# Patient Record
Sex: MALE | Race: WHITE | NOT HISPANIC OR LATINO | Employment: OTHER | URBAN - METROPOLITAN AREA
[De-identification: names, ages, dates, MRNs, and addresses within clinical notes are randomized per-mention and may not be internally consistent; named-entity substitution may affect disease eponyms.]

---

## 2023-01-13 ENCOUNTER — HOSPITAL ENCOUNTER (EMERGENCY)
Facility: HOSPITAL | Age: 73
Discharge: HOME | End: 2023-01-13
Attending: EMERGENCY MEDICINE

## 2023-01-13 VITALS
OXYGEN SATURATION: 95 % | HEART RATE: 92 BPM | SYSTOLIC BLOOD PRESSURE: 160 MMHG | RESPIRATION RATE: 18 BRPM | DIASTOLIC BLOOD PRESSURE: 85 MMHG

## 2023-01-13 DIAGNOSIS — S61.011A LACERATION OF RIGHT THUMB WITHOUT FOREIGN BODY WITHOUT DAMAGE TO NAIL, INITIAL ENCOUNTER: Primary | ICD-10-CM

## 2023-01-13 RX ORDER — LIDOCAINE HYDROCHLORIDE 10 MG/ML
5 INJECTION, SOLUTION EPIDURAL; INFILTRATION; INTRACAUDAL; PERINEURAL ONCE
Status: COMPLETED | OUTPATIENT
Start: 2023-01-13 | End: 2023-01-13

## 2023-01-13 RX ADMIN — LIDOCAINE HYDROCHLORIDE 5 ML: 10 INJECTION, SOLUTION EPIDURAL; INFILTRATION; INTRACAUDAL at 20:49

## 2023-01-14 NOTE — ED ATTENDING ATTESTATION
1/13/2023  IDilip DO, saw and evaluated the patient  I have discussed the patient with the resident/non-physician practitioner and agree with the resident's/non-physician practitioner's findings, Plan of Care, and MDM as documented in the resident's/non-physician practitioner's note, except where noted  All available labs and Radiology studies were reviewed  I was present for key portions of any procedure(s) performed by the resident/non-physician practitioner and I was immediately available to provide assistance  At this point I agree with the current assessment done in the Emergency Department  I have conducted an independent evaluation of this patient a history and physical is as follows:      77-year-old male presents with a laceration to his right thumb  Sustained it while squeezing a glass when it fell onto the sink, glass shattered and cut his hand no foreign body sensation  No other lacerations, this is over the lateral aspect of his thumb not over any tendons  ,  Full range of motion last tetanus shot was 3 years ago  History obtained from patient and his son      Review of Systems   Constitutional: Negative for fever  Respiratory: Negative for chest tightness and shortness of breath  Cardiovascular: Negative for chest pain  Skin: Negative for rash  Neurological: Negative for dizziness, light-headedness and headaches  Physical Exam  Vitals reviewed  Constitutional:       Appearance: He is well-developed  HENT:      Head: Atraumatic  Eyes:      General: No scleral icterus  Right eye: No discharge  Left eye: No discharge  Conjunctiva/sclera: Conjunctivae normal    Neck:      Trachea: No tracheal deviation  Pulmonary:      Effort: Pulmonary effort is normal  No respiratory distress  Breath sounds: No stridor  Musculoskeletal:         General: No deformity  Cervical back: Neck supple  Skin:     General: Skin is warm and dry  Coloration: Skin is not pale  Findings: No erythema or rash  Comments: 1 5 cm laceration lateral aspect of right thumb no active bleeding full range of motion full strength against resistance in flexion and extension no evidence of tendon laceration   Neurological:      Mental Status: He is alert  Motor: No abnormal muscle tone  Coordination: Coordination normal                         ED Course         Critical Care Time  Procedures      MDM  Number of Diagnoses or Management Options  Diagnosis management comments:       Initial ED assessment: 80-year-old male laceration to lateral aspect of the thumb without evidence of tendon injury    Initial ED plan:   Suture repair,  patient discharged           Amount and/or Complexity of Data Reviewed  Decide to obtain previous medical records or to obtain history from someone other than the patient: yes  Obtain history from someone other than the patient: yes  Review and summarize past medical records: yes            Time reflects when diagnosis was documented in both MDM as applicable and the Disposition within this note     Time User Action Codes Description Comment    1/13/2023  8:56 PM Albina, Jay [address] Laceration of right thumb without foreign body without damage to nail, initial encounter       ED Disposition     ED Disposition   Discharge    Condition   Stable    Date/Time   Fri Jan 13, 2023  8:56 PM    Comment   Ellie Cobos discharge to home/self care                 Follow-up Information     Follow up With Specialties Details Why Contact Info Additional [address] Emergency Department Emergency Medicine Go in 10 days For suture removal [address] [facility] [id number] [place] [address] Emergency Department, [address], [place], [place], [address]

## 2023-01-14 NOTE — ED PROVIDER NOTES
History  Chief Complaint   Patient presents with   • Finger Laceration     Pt arrives c/o laceration from broken glass to R hand 1st digit  Bleeding controlled  68-year-old male presents with a laceration to his right thumb  Sustained it while squeezing a glass when it fell onto the sink, glass shattered and cut his hand no foreign body sensation  No other lacerations, this is over the lateral aspect of his thumb not over any tendons  ,  Full range of motion last tetanus shot was 3 years ago  History obtained from patient and his son        Review of Systems   Constitutional: Negative for fever  Respiratory: Negative for chest tightness and shortness of breath  Cardiovascular: Negative for chest pain  Skin: Negative for rash  Neurological: Negative for dizziness, light-headedness and headaches                             ED Course        Critical Care Time  Procedures          None       Past Medical History:   Diagnosis Date   • Anxiety    • Depression        Past Surgical History:   Procedure Laterality Date   • APPENDECTOMY     • HERNIA REPAIR     • KNEE ARTHROSCOPY Bilateral    • REPLACEMENT TOTAL KNEE Right        History reviewed  No pertinent family history  I have reviewed and agree with the history as documented  E-Cigarette/Vaping     E-Cigarette/Vaping Substances     Social History     Tobacco Use   • Smoking status: Never   • Smokeless tobacco: Never   Substance Use Topics   • Alcohol use: Never   • Drug use: Never       Review of Systems    Physical Exam  Physical Exam           Physical Exam  Vitals reviewed  Constitutional:       Appearance: He is well-developed  HENT:      Head: Atraumatic  Eyes:      General: No scleral icterus  Right eye: No discharge  Left eye: No discharge  Conjunctiva/sclera: Conjunctivae normal    Neck:      Trachea: No tracheal deviation  Pulmonary:      Effort: Pulmonary effort is normal  No respiratory distress        Breath sounds: No stridor  Musculoskeletal:         General: No deformity  Cervical back: Neck supple  Skin:     General: Skin is warm and dry  Coloration: Skin is not pale  Findings: No erythema or rash  Comments: 1 5 cm laceration lateral aspect of right thumb no active bleeding full range of motion full strength against resistance in flexion and extension no evidence of tendon laceration   Neurological:      Mental Status: He is alert  Motor: No abnormal muscle tone  Coordination: Coordination normal                  Vital Signs  ED Triage Vitals [01/13/23 2019]   Temp Pulse Respirations Blood Pressure SpO2   -- 92 18 160/85 95 %      Temp Source Heart Rate Source Patient Position - Orthostatic VS BP Location FiO2 (%)   Oral Monitor Sitting Right arm --      Pain Score       --           Vitals:    01/13/23 2019   BP: 160/85   Pulse: 92   Patient Position - Orthostatic VS: Sitting         Visual Acuity      ED Medications  Medications   lidocaine (PF) (XYLOCAINE-MPF) 1 % injection 5 mL (has no administration in time range)       Diagnostic Studies  Results Reviewed     None                 No orders to display              Procedures  Laceration repair    Date/Time: 1/13/2023 8:55 PM  Performed by: Jagdeep Dominguez DO  Authorized by: Jagdeep Dominguez DO   Consent: Verbal consent obtained  Required items: required blood products, implants, devices, and special equipment available  Body area: upper extremity  Location details: right thumb  Laceration length: 1 5 cm  Anesthesia: local infiltration    Anesthesia:  Local Anesthetic: lidocaine 1% without epinephrine  Anesthetic total: 3 mL      Procedure Details:  Preparation: Patient was prepped and draped in the usual sterile fashion    Irrigation solution: saline  Debridement: none  Skin closure: 4-0 nylon  Number of sutures: 3  Technique: simple  Approximation: close  Approximation difficulty: simple  Dressing: 4x4 sterile gauze               ED Course                                             Medical Decision Making          Initial ED assessment: 66-year-old male laceration to lateral aspect of the thumb without evidence of tendon injury     Initial ED plan:   Suture repair,  patient discharged    Risk  Prescription drug management  Disposition  Final diagnoses:   Laceration of right thumb without foreign body without damage to nail, initial encounter     Time reflects when diagnosis was documented in both MDM as applicable and the Disposition within this note     Time User Action Codes Description Comment    1/13/2023  8:56 PM [address] Laceration of right thumb without foreign body without damage to nail, initial encounter       ED Disposition     ED Disposition   Discharge    Condition   Stable    Date/Time   Fri Jan 13, 2023  8:56 PM    Comment   Rosa Kirkland discharge to home/self care  Follow-up Information     Follow up With Specialties Details Why Contact Info Additional [address] Emergency Department Emergency Medicine Go in 10 days For suture removal [address] Emergency Department, [address], [place], [place], [address]          Patient's Medications    No medications on file       No discharge procedures on file      PDMP Review     None          ED Provider  Electronically Signed by           Jordan Rodríguez DO  01/13/23 2057

## 2023-02-09 ENCOUNTER — OFFICE VISIT (OUTPATIENT)
Dept: FAMILY MEDICINE CLINIC | Facility: CLINIC | Age: 73
End: 2023-02-09

## 2023-02-09 VITALS
HEART RATE: 80 BPM | WEIGHT: 202.4 LBS | HEIGHT: 69 IN | DIASTOLIC BLOOD PRESSURE: 84 MMHG | OXYGEN SATURATION: 97 % | TEMPERATURE: 97.9 F | SYSTOLIC BLOOD PRESSURE: 140 MMHG | BODY MASS INDEX: 29.98 KG/M2

## 2023-02-09 DIAGNOSIS — M47.812 OSTEOARTHRITIS OF CERVICAL SPINE, UNSPECIFIED SPINAL OSTEOARTHRITIS COMPLICATION STATUS: ICD-10-CM

## 2023-02-09 DIAGNOSIS — E78.2 MIXED HYPERLIPIDEMIA: ICD-10-CM

## 2023-02-09 DIAGNOSIS — Z12.5 SCREENING FOR PROSTATE CANCER: ICD-10-CM

## 2023-02-09 DIAGNOSIS — F32.9 REACTIVE DEPRESSION: ICD-10-CM

## 2023-02-09 DIAGNOSIS — R53.83 FATIGUE, UNSPECIFIED TYPE: ICD-10-CM

## 2023-02-09 DIAGNOSIS — R73.9 ELEVATED BLOOD SUGAR: ICD-10-CM

## 2023-02-09 DIAGNOSIS — Z79.899 ENCOUNTER FOR LONG-TERM (CURRENT) USE OF MEDICATIONS: ICD-10-CM

## 2023-02-09 DIAGNOSIS — R10.31: Primary | ICD-10-CM

## 2023-02-09 DIAGNOSIS — Z11.59 NEED FOR HEPATITIS C SCREENING TEST: ICD-10-CM

## 2023-02-09 DIAGNOSIS — E55.9 VITAMIN D INSUFFICIENCY: ICD-10-CM

## 2023-02-09 RX ORDER — CETIRIZINE HYDROCHLORIDE, PSEUDOEPHEDRINE HYDROCHLORIDE 5; 120 MG/1; MG/1
1 TABLET, FILM COATED, EXTENDED RELEASE ORAL DAILY
COMMUNITY
Start: 2010-01-01

## 2023-02-09 RX ORDER — BUPROPION HYDROCHLORIDE 150 MG/1
150 TABLET ORAL DAILY
Qty: 90 TABLET | Refills: 3 | Status: SHIPPED | OUTPATIENT
Start: 2023-02-09

## 2023-02-09 RX ORDER — CELECOXIB 200 MG/1
200 CAPSULE ORAL DAILY
COMMUNITY
Start: 2010-01-01 | End: 2023-02-09 | Stop reason: SDUPTHER

## 2023-02-09 RX ORDER — BUPROPION HYDROCHLORIDE 150 MG/1
150 TABLET ORAL DAILY
COMMUNITY
Start: 2019-09-30 | End: 2023-02-09 | Stop reason: SDUPTHER

## 2023-02-09 RX ORDER — CELECOXIB 200 MG/1
200 CAPSULE ORAL DAILY
Qty: 90 CAPSULE | Refills: 1 | Status: SHIPPED | OUTPATIENT
Start: 2023-02-09

## 2023-02-09 RX ORDER — ALPRAZOLAM 0.5 MG/1
0.5 TABLET ORAL 2 TIMES DAILY
COMMUNITY
Start: 2019-09-30 | End: 2023-02-09 | Stop reason: SDUPTHER

## 2023-02-09 RX ORDER — ALPRAZOLAM 0.5 MG/1
0.5 TABLET ORAL 2 TIMES DAILY
Qty: 60 TABLET | Refills: 3 | Status: SHIPPED | OUTPATIENT
Start: 2023-02-09

## 2023-02-09 RX ORDER — OMEPRAZOLE 20 MG/1
20 CAPSULE, DELAYED RELEASE ORAL DAILY
COMMUNITY
Start: 2010-01-01

## 2023-02-09 NOTE — PROGRESS NOTES
Assessment/Plan:  68 yo male, just moved her from [facility] in Oct      Will see Dr Jennifer Carrero for prob with the R TKA replaced 7 mo ago in [place], and the L needs to be done  Will see him soon  BMI Counseling: Body mass index is 29 89 kg/m²  The BMI is above normal  Nutrition recommendations include decreasing portion sizes, encouraging healthy choices of fruits and vegetables, decreasing fast food intake, consuming healthier snacks, limiting drinks that contain sugar, moderation in carbohydrate intake, increasing intake of lean protein and reducing intake of saturated and trans fat  Exercise recommendations include moderate physical activity 150 minutes/week and exercising 3-5 times per week  No pharmacotherapy was ordered  Rationale for BMI follow-up plan is due to patient being overweight or obese  Depression Screening and Follow-up Plan: Patient was screened for depression during today's encounter  They screened negative with a PHQ-2 score of 2          1  RLQ distress  Comments:  Had hernia surg 30 yrs ago and AP about 2-3 yrs ago, now for one mo, some distress in that area - painful when lifts leg and ties shoes  Orders:  -     Ambulatory referral to General Surgery; Future; Expected date: 02/23/2023  -     CBC; Future  -     UA w Reflex to Microscopic w Reflex to Culture  -     Comprehensive metabolic panel; Future  -     Lipid panel; Future  -     TSH, 3rd generation; Future  -     Vitamin D 25 hydroxy; Future  -     PSA, Total Screen; Future    2  Reactive depression  -     ALPRAZolam (XANAX) 0 5 mg tablet; Take 1 tablet (0 5 mg total) by mouth 2 (two) times a day Pt states taking OD PRN  -     buPROPion (WELLBUTRIN XL) 150 mg 24 hr tablet; Take 1 tablet (150 mg total) by mouth in the morning  -     CBC; Future  -     UA w Reflex to Microscopic w Reflex to Culture  -     Comprehensive metabolic panel; Future  -     Lipid panel; Future  -     TSH, 3rd generation;  Future  -     Vitamin D 25 hydroxy; Future  -     PSA, Total Screen; Future    3  Osteoarthritis of cervical spine, unspecified spinal osteoarthritis complication status  -     celecoxib (CeleBREX) 200 mg capsule; Take 1 capsule (200 mg total) by mouth in the morning  -     Comprehensive metabolic panel; Future    4  Elevated blood sugar  -     Comprehensive metabolic panel; Future    5  Fatigue, unspecified type  -     TSH, 3rd generation; Future    6  Vitamin D insufficiency  -     Vitamin D 25 hydroxy; Future    7  Screening for prostate cancer  -     PSA, Total Screen; Future    8  Encounter for long-term (current) use of medications  -     Ambulatory referral to General Surgery; Future; Expected date: 02/23/2023  -     CBC; Future  -     UA w Reflex to Microscopic w Reflex to Culture  -     Comprehensive metabolic panel; Future  -     Lipid panel; Future  -     TSH, 3rd generation; Future  -     Vitamin D 25 hydroxy; Future  -     PSA, Total Screen; Future    9  Mixed hyperlipidemia  -     Lipid panel; Future    10  Need for hepatitis C screening test  -     Hepatitis C Antibody ([facility]); Future          Subjective:      Patient ID: Adalberto Zhang is a 67 y o  male  HPI    The following portions of the patient's history were reviewed and updated as appropriate: He  has a past medical history of Anxiety and Depression  He There are no problems to display for this patient  He  has a past surgical history that includes Replacement total knee (Right); Hernia repair; Appendectomy; Knee arthroscopy (Bilateral); and Wrist surgery (Left, 2021)  His family history includes Crohn's disease in his father; Diabetes in his sister; Heart failure in his sister; Lung disease in his mother  He  reports that he has never smoked  He has never used smokeless tobacco  He reports current alcohol use of about 2 0 standard drinks per week  He reports that he does not use drugs    Current Outpatient Medications   Medication Sig Dispense Refill • ALPRAZolam (XANAX) 0 5 mg tablet Take 1 tablet (0 5 mg total) by mouth 2 (two) times a day Pt states taking OD PRN 60 tablet 3   • buPROPion (WELLBUTRIN XL) 150 mg 24 hr tablet Take 1 tablet (150 mg total) by mouth in the morning 90 tablet 3   • celecoxib (CeleBREX) 200 mg capsule Take 1 capsule (200 mg total) by mouth in the morning 90 capsule 1   • cetirizine-pseudoephedrine (ZyrTEC-D) 5-120 MG per tablet Take 1 tablet by mouth in the morning     • omeprazole (PriLOSEC) 20 mg delayed release capsule Take 20 mg by mouth in the morning       No current facility-administered medications for this visit  Current Outpatient Medications on File Prior to Visit   Medication Sig   • cetirizine-pseudoephedrine (ZyrTEC-D) 5-120 MG per tablet Take 1 tablet by mouth in the morning   • omeprazole (PriLOSEC) 20 mg delayed release capsule Take 20 mg by mouth in the morning   • [DISCONTINUED] ALPRAZolam (XANAX) 0 5 mg tablet Take 0 5 mg by mouth 2 (two) times a day Pt states taking OD PRN   • [DISCONTINUED] buPROPion (WELLBUTRIN XL) 150 mg 24 hr tablet Take 150 mg by mouth in the morning   • [DISCONTINUED] celecoxib (CeleBREX) 200 mg capsule Take 200 mg by mouth in the morning     No current facility-administered medications on file prior to visit  He has No Known Allergies       Review of Systems      Objective:      /84 (BP Location: Left arm, Patient Position: Sitting, Cuff Size: Standard)   Pulse 80   Temp 97 9 °F (36 6 °C) (Temporal)   Ht 5' 9" (1 753 m)   Wt 91 8 kg (202 lb 6 4 oz)   SpO2 97%   BMI 29 89 kg/m²          Physical Exam  Vitals and nursing note reviewed  Constitutional:       General: He is not in acute distress  Appearance: Normal appearance  He is well-developed  He is not ill-appearing, toxic-appearing or diaphoretic  HENT:      Head: Normocephalic and atraumatic        Right Ear: Tympanic membrane normal       Left Ear: Tympanic membrane normal       Nose: Nose normal  Mouth/Throat:      Mouth: Mucous membranes are moist    Eyes:      Pupils: Pupils are equal, round, and reactive to light  Neck:      Trachea: No tracheal deviation  Cardiovascular:      Rate and Rhythm: Normal rate and regular rhythm  Pulses: Normal pulses  Heart sounds: Normal heart sounds  Pulmonary:      Effort: Pulmonary effort is normal       Breath sounds: Normal breath sounds  No wheezing, rhonchi or rales  Abdominal:      General: Abdomen is flat  Palpations: Abdomen is soft  Musculoskeletal:         General: Normal range of motion  Cervical back: Normal range of motion and neck supple  Lymphadenopathy:      Cervical: No cervical adenopathy  Skin:     General: Skin is warm and dry  Neurological:      General: No focal deficit present  Mental Status: He is alert and oriented to person, place, and time  Mental status is at baseline  Psychiatric:         Mood and Affect: Mood normal          Behavior: Behavior normal          Thought Content: Thought content normal          Judgment: Judgment normal          One hr with pt and he is new to me   This time was spent reviewing previous records, reviewing previous laboratory and other tests, taking history from patient, examination of patient, discussion of prognosis and treatment, ordering laboratory tests, ordering medications, and completion of the medical record

## 2023-02-10 ENCOUNTER — APPOINTMENT (OUTPATIENT)
Dept: LAB | Facility: AMBULARY SURGERY CENTER | Age: 73
End: 2023-02-10

## 2023-02-10 DIAGNOSIS — E55.9 VITAMIN D INSUFFICIENCY: ICD-10-CM

## 2023-02-10 DIAGNOSIS — F32.9 REACTIVE DEPRESSION: ICD-10-CM

## 2023-02-10 DIAGNOSIS — Z79.899 ENCOUNTER FOR LONG-TERM (CURRENT) USE OF MEDICATIONS: ICD-10-CM

## 2023-02-10 DIAGNOSIS — R10.31: ICD-10-CM

## 2023-02-10 DIAGNOSIS — R53.83 FATIGUE, UNSPECIFIED TYPE: ICD-10-CM

## 2023-02-10 DIAGNOSIS — E78.2 MIXED HYPERLIPIDEMIA: ICD-10-CM

## 2023-02-10 DIAGNOSIS — Z11.59 NEED FOR HEPATITIS C SCREENING TEST: ICD-10-CM

## 2023-02-10 DIAGNOSIS — R73.9 ELEVATED BLOOD SUGAR: ICD-10-CM

## 2023-02-10 DIAGNOSIS — M47.812 OSTEOARTHRITIS OF CERVICAL SPINE, UNSPECIFIED SPINAL OSTEOARTHRITIS COMPLICATION STATUS: ICD-10-CM

## 2023-02-10 DIAGNOSIS — Z12.5 SCREENING FOR PROSTATE CANCER: ICD-10-CM

## 2023-02-10 LAB
25(OH)D3 SERPL-MCNC: 21 NG/ML (ref 30–100)
ALBUMIN SERPL BCP-MCNC: 3.7 G/DL (ref 3.5–5)
ALP SERPL-CCNC: 83 U/L (ref 46–116)
ALT SERPL W P-5'-P-CCNC: 24 U/L (ref 12–78)
ANION GAP SERPL CALCULATED.3IONS-SCNC: 2 MMOL/L (ref 4–13)
AST SERPL W P-5'-P-CCNC: 20 U/L (ref 5–45)
BACTERIA UR QL AUTO: ABNORMAL /HPF
BILIRUB SERPL-MCNC: 0.44 MG/DL (ref 0.2–1)
BILIRUB UR QL STRIP: NEGATIVE
BUN SERPL-MCNC: 22 MG/DL (ref 5–25)
CALCIUM SERPL-MCNC: 9.4 MG/DL (ref 8.3–10.1)
CHLORIDE SERPL-SCNC: 106 MMOL/L (ref 96–108)
CHOLEST SERPL-MCNC: 215 MG/DL
CLARITY UR: CLEAR
CO2 SERPL-SCNC: 27 MMOL/L (ref 21–32)
COLOR UR: ABNORMAL
CREAT SERPL-MCNC: 1.03 MG/DL (ref 0.6–1.3)
ERYTHROCYTE [DISTWIDTH] IN BLOOD BY AUTOMATED COUNT: 14.5 % (ref 11.6–15.1)
GFR SERPL CREATININE-BSD FRML MDRD: 72 ML/MIN/1.73SQ M
GLUCOSE P FAST SERPL-MCNC: 93 MG/DL (ref 65–99)
GLUCOSE UR STRIP-MCNC: NEGATIVE MG/DL
HCT VFR BLD AUTO: 48.2 % (ref 36.5–49.3)
HCV AB SER QL: NORMAL
HDLC SERPL-MCNC: 59 MG/DL
HGB BLD-MCNC: 15.5 G/DL (ref 12–17)
HGB UR QL STRIP.AUTO: NEGATIVE
KETONES UR STRIP-MCNC: NEGATIVE MG/DL
LDLC SERPL CALC-MCNC: 144 MG/DL (ref 0–100)
LEUKOCYTE ESTERASE UR QL STRIP: NEGATIVE
MCH RBC QN AUTO: 27.4 PG (ref 26.8–34.3)
MCHC RBC AUTO-ENTMCNC: 32.2 G/DL (ref 31.4–37.4)
MCV RBC AUTO: 85 FL (ref 82–98)
MUCOUS THREADS UR QL AUTO: ABNORMAL
NITRITE UR QL STRIP: NEGATIVE
NON-SQ EPI CELLS URNS QL MICRO: ABNORMAL /HPF
NONHDLC SERPL-MCNC: 156 MG/DL
PH UR STRIP.AUTO: 6.5 [PH]
PLATELET # BLD AUTO: 320 THOUSANDS/UL (ref 149–390)
PMV BLD AUTO: 9.9 FL (ref 8.9–12.7)
POTASSIUM SERPL-SCNC: 4.3 MMOL/L (ref 3.5–5.3)
PROT SERPL-MCNC: 8.3 G/DL (ref 6.4–8.4)
PROT UR STRIP-MCNC: ABNORMAL MG/DL
PSA SERPL-MCNC: 1 NG/ML (ref 0–4)
RBC # BLD AUTO: 5.66 MILLION/UL (ref 3.88–5.62)
RBC #/AREA URNS AUTO: ABNORMAL /HPF
SODIUM SERPL-SCNC: 135 MMOL/L (ref 135–147)
SP GR UR STRIP.AUTO: 1.02 (ref 1–1.03)
TRIGL SERPL-MCNC: 62 MG/DL
TSH SERPL DL<=0.05 MIU/L-ACNC: 2.06 UIU/ML (ref 0.45–4.5)
UROBILINOGEN UR STRIP-ACNC: <2 MG/DL
WBC # BLD AUTO: 6.57 THOUSAND/UL (ref 4.31–10.16)
WBC #/AREA URNS AUTO: ABNORMAL /HPF

## 2023-02-16 ENCOUNTER — CONSULT (OUTPATIENT)
Dept: SURGERY | Facility: CLINIC | Age: 73
End: 2023-02-16

## 2023-02-16 VITALS
TEMPERATURE: 97.4 F | DIASTOLIC BLOOD PRESSURE: 78 MMHG | WEIGHT: 203 LBS | RESPIRATION RATE: 16 BRPM | BODY MASS INDEX: 30.07 KG/M2 | HEIGHT: 69 IN | SYSTOLIC BLOOD PRESSURE: 126 MMHG | OXYGEN SATURATION: 98 % | HEART RATE: 90 BPM

## 2023-02-16 DIAGNOSIS — K40.91 RECURRENT RIGHT INGUINAL HERNIA: Primary | ICD-10-CM

## 2023-02-16 DIAGNOSIS — R10.31: ICD-10-CM

## 2023-02-16 DIAGNOSIS — Z79.899 ENCOUNTER FOR LONG-TERM (CURRENT) USE OF MEDICATIONS: ICD-10-CM

## 2023-02-16 NOTE — PROGRESS NOTES
The patient is a 43-year-old male who has about a 1 month history of intermittent right groin pain  He notices this when he is sitting and driving and then getting up from a sitting position  He does not get this when he lies down at night  He had a open hernia repair on the right side with mesh about 30 years ago and this pain only started 1 month ago  He had a lump 30 years ago, he does not have one now  He has no urinary symptomatology  He has undergone colonoscopy within the past year and it was negative except for 1 small polyp  He recently underwent a total knee replacement on the right side  On physical examination this is a well-developed white male who is in no distress whatsoever  Heart and lung semination are negative as is a neck examination  He has normal external genitalia  He has no evidence of a recurrent right inguinal hernia  He is tender underneath his scar but this is minimally so  There is no protrusion  The testicle is normal   On the left side there is no hernia with definition  He has a little bit of a transmitted pulse when he called but I do not think there is actually anything coming through the ring  I told him he simply needs a CAT scan done  I will go from there

## 2023-02-17 ENCOUNTER — TELEPHONE (OUTPATIENT)
Dept: SURGERY | Facility: CLINIC | Age: 73
End: 2023-02-17

## 2023-02-17 NOTE — TELEPHONE ENCOUNTER
LM for patient to call me back on my direct line  In regards to his oral contrast that will be needed for his CT scan

## 2023-03-08 ENCOUNTER — HOSPITAL ENCOUNTER (OUTPATIENT)
Dept: CT IMAGING | Facility: HOSPITAL | Age: 73
Discharge: HOME | End: 2023-03-08
Attending: SURGERY

## 2023-03-08 DIAGNOSIS — K40.91 RECURRENT RIGHT INGUINAL HERNIA: ICD-10-CM

## 2023-03-08 RX ADMIN — IOHEXOL 100 ML: 350 INJECTION, SOLUTION INTRAVENOUS at 08:19

## 2023-05-16 ENCOUNTER — OFFICE VISIT (OUTPATIENT)
Dept: FAMILY MEDICINE CLINIC | Facility: CLINIC | Age: 73
End: 2023-05-16

## 2023-05-16 VITALS
HEIGHT: 69 IN | TEMPERATURE: 98.1 F | OXYGEN SATURATION: 95 % | HEART RATE: 85 BPM | SYSTOLIC BLOOD PRESSURE: 124 MMHG | WEIGHT: 204.2 LBS | DIASTOLIC BLOOD PRESSURE: 78 MMHG | BODY MASS INDEX: 30.24 KG/M2

## 2023-05-16 DIAGNOSIS — Z00.00 MEDICARE ANNUAL WELLNESS VISIT, SUBSEQUENT: Primary | ICD-10-CM

## 2023-05-16 DIAGNOSIS — F32.9 REACTIVE DEPRESSION: ICD-10-CM

## 2023-05-16 DIAGNOSIS — M47.812 OSTEOARTHRITIS OF CERVICAL SPINE, UNSPECIFIED SPINAL OSTEOARTHRITIS COMPLICATION STATUS: ICD-10-CM

## 2023-05-16 DIAGNOSIS — Z79.899 MEDICATION MANAGEMENT: ICD-10-CM

## 2023-05-16 DIAGNOSIS — Z79.899 ENCOUNTER FOR LONG-TERM (CURRENT) USE OF MEDICATIONS: ICD-10-CM

## 2023-05-16 RX ORDER — CEPHALEXIN 500 MG/1
4 CAPSULE ORAL EVERY MORNING
COMMUNITY
Start: 2023-05-04

## 2023-05-16 RX ORDER — NAPROXEN SODIUM 220 MG
220 TABLET ORAL DAILY PRN
COMMUNITY

## 2023-05-16 RX ORDER — OXYMETAZOLINE HYDROCHLORIDE 0.05 G/100ML
2 SPRAY NASAL DAILY
COMMUNITY

## 2023-05-16 NOTE — PROGRESS NOTES
Assessment and Plan:     Problem List Items Addressed This Visit    None       Preventive health issues were discussed with patient, and age appropriate screening tests were ordered as noted in patient's After Visit Summary  Personalized health advice and appropriate referrals for health education or preventive services given if needed, as noted in patient's After Visit Summary  History of Present Illness:     Patient presents for a Medicare Wellness Visit    HPI   Patient Care Team:  DO uri Galeana PCP - General (Family Medicine)     Review of Systems:     Review of Systems     Problem List:     Patient Active Problem List   Diagnosis   • Recurrent right inguinal hernia      Past Medical and Surgical History:     Past Medical History:   Diagnosis Date   • Anxiety    • Depression      Past Surgical History:   Procedure Laterality Date   • APPENDECTOMY     • HERNIA REPAIR     • KNEE ARTHROSCOPY Bilateral    • REPLACEMENT TOTAL KNEE Right    • WRIST SURGERY Left     L  Wrist--removed small bone and replaced w  tendon due to arthritis      Family History:     Family History   Problem Relation Age of Onset   • Lung disease Mother          at 80   • Crohn's disease Father    • Diabetes Sister    • Heart failure Sister       Social History:     Social History     Socioeconomic History   • Marital status: Single     Spouse name: None   • Number of children: None   • Years of education: None   • Highest education level: None   Occupational History   • None   Tobacco Use   • Smoking status: Never   • Smokeless tobacco: Never   Vaping Use   • Vaping Use: Never used   Substance and Sexual Activity   • Alcohol use:  Yes     Alcohol/week: 2 0 standard drinks     Types: 2 Glasses of wine per week     Comment: 1 glass wine/3 nights   • Drug use: Never   • Sexual activity: Not Currently   Other Topics Concern   • None   Social History Narrative   • None     Social Determinants of Health     Financial Resource Strain: Not on file   Food Insecurity: Not on file   Transportation Needs: Not on file   Physical Activity: Not on file   Stress: Not on file   Social Connections: Not on file   Intimate Partner Violence: Not on file   Housing Stability: Not on file      Medications and Allergies:     Current Outpatient Medications   Medication Sig Dispense Refill   • ALPRAZolam (XANAX) 0 5 mg tablet Take 1 tablet (0 5 mg total) by mouth 2 (two) times a day Pt states taking OD PRN 60 tablet 3   • buPROPion (WELLBUTRIN XL) 150 mg 24 hr tablet Take 1 tablet (150 mg total) by mouth in the morning 90 tablet 3   • celecoxib (CeleBREX) 200 mg capsule Take 1 capsule (200 mg total) by mouth in the morning 90 capsule 1   • cephalexin (KEFLEX) 500 mg capsule Take 4 capsules by mouth every morning PRN dental procedures -- 4 tablets morning of     • cetirizine-pseudoephedrine (ZyrTEC-D) 5-120 MG per tablet Take 1 tablet by mouth in the morning     • naproxen sodium (ALEVE) 220 MG tablet Take 220 mg by mouth daily as needed     • omeprazole (PriLOSEC) 20 mg delayed release capsule Take 20 mg by mouth in the morning     • oxymetazoline (AFRIN) 0 05 % nasal spray 2 sprays into each nostril in the morning       No current facility-administered medications for this visit  No Known Allergies   Immunizations:     Immunization History   Administered Date(s) Administered   • INFLUENZA 12/09/2022      Health Maintenance:         Topic Date Due   • Colorectal Cancer Screening  Never done   • Hepatitis C Screening  Completed         Topic Date Due   • COVID-19 Vaccine (1) Never done   • Pneumococcal Vaccine: 65+ Years (1 - PCV) Never done      Medicare Screening Tests and Risk Assessments:     Jodi Gibbs is here for his Subsequent Wellness visit  Last Medicare Wellness visit information reviewed, patient interviewed and updates made to the record today  Health Risk Assessment:   Patient rates overall health as fair   Patient feels that their physical health rating is slightly worse  Patient is satisfied with their life  Eyesight was rated as same  Hearing was rated as same  Patient feels that their emotional and mental health rating is same  Patients states they are never, rarely angry  Patient states they are never, rarely unusually tired/fatigued  Pain experienced in the last 7 days has been some  Patient's pain rating has been 5/10  Patient states that he has experienced no weight loss or gain in last 6 months  Pt has chronic arthritic pain     Fall Risk Screening: In the past year, patient has experienced: no history of falling in past year      Home Safety:  Patient does not have trouble with stairs inside or outside of their home  Patient has working smoke alarms and has working carbon monoxide detector  Home safety hazards include: none  Nutrition:   Current diet is Regular, Limited junk food, Low Cholesterol and Low Saturated Fat  Pt eats good portion sizes, regular intake of protein, vegetables, and fruits     Medications:   Patient is not currently taking any over-the-counter supplements  Patient is able to manage medications  Activities of Daily Living (ADLs)/Instrumental Activities of Daily Living (IADLs):   Walk and transfer into and out of bed and chair?: Yes  Dress and groom yourself?: Yes    Bathe or shower yourself?: Yes    Feed yourself?  Yes  Do your laundry/housekeeping?: Yes  Manage your money, pay your bills and track your expenses?: Yes  Make your own meals?: Yes    Do your own shopping?: Yes    Previous Hospitalizations:   Any hospitalizations or ED visits within the last 12 months?: Yes    How many hospitalizations have you had in the last year?: 1-2    Hospitalization Comments: Pt had one ER visit 2-3 mos ago due to cut on thumb while washing glasses in sink     Advance Care Planning:     Advanced directive: Yes    Advanced directive counseling given: Yes    Five wishes given: No    Patient declined ACP directive: No    End of "Life Decisions reviewed with patient: Yes    Provider agrees with end of life decisions: Yes      Comments: Pt has living will and will bring into office to scan into chart     Cognitive Screening:   Provider or family/friend/caregiver concerned regarding cognition?: No    PREVENTIVE SCREENINGS      Cardiovascular Screening:    General: Screening Not Indicated and History Lipid Disorder      Diabetes Screening:     General: Screening Current      Prostate Cancer Screening:    General: Screening Current      Abdominal Aortic Aneurysm (AAA) Screening:    Risk factors include: age between 73-69 yo        Lung Cancer Screening:     General: Screening Not Indicated      Hepatitis C Screening:    General: Screening Current    Screening, Brief Intervention, and Referral to Treatment (SBIRT)    Screening  Typical number of drinks in a day: 0  Typical number of drinks in a week: 3  Interpretation: Low risk drinking behavior  Single Item Drug Screening:  How often have you used an illegal drug (including marijuana) or a prescription medication for non-medical reasons in the past year? never    Single Item Drug Screen Score: 0  Interpretation: Negative screen for possible drug use disorder    Other Counseling Topics:   Car/seat belt/driving safety, skin self-exam, sunscreen and calcium and vitamin D intake and regular weightbearing exercise  No results found       Physical Exam:     /78 (BP Location: Left arm, Patient Position: Sitting, Cuff Size: Standard)   Pulse 85   Temp 98 1 °F (36 7 °C) (Temporal)   Ht 5' 9\" (1 753 m)   Wt 92 6 kg (204 lb 3 2 oz)   SpO2 95%   BMI 30 16 kg/m²     Physical Exam     Juan Jose Packer DO  "

## 2023-05-16 NOTE — PROGRESS NOTES
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 [employer]   twice  Divorce ended one yr ago  1 son from 1st marriage -  53 yo   - recently  1 daughter from 2d marriage - no communication  Pt is Rastafarian; wife is Confucianism==> 28 yr (daughter is Mandaen)    Had injection in the R wrist few wks ago - arthritis - helped  Review of Systems   Constitutional: Negative for activity change, appetite change, chills, diaphoresis, fatigue, fever and unexpected weight change  HENT: Negative for congestion, dental problem, drooling, ear discharge, ear pain, facial swelling, mouth sores, nosebleeds, postnasal drip, rhinorrhea, trouble swallowing and voice change  Eyes: Negative for photophobia, pain, discharge, redness, itching and visual disturbance  Respiratory: Negative for apnea, cough, choking, chest tightness and shortness of breath  Cardiovascular: Negative for chest pain and leg swelling  Gastrointestinal: Negative for abdominal distention, abdominal pain, constipation, diarrhea and nausea  Endocrine: Negative for polydipsia, polyphagia and polyuria  Genitourinary: Negative for decreased urine volume, difficulty urinating, dysuria, enuresis and hematuria  Musculoskeletal: Positive for arthralgias  Negative for back pain, gait problem and joint swelling          Arthritis in hands and "neck  Occas injection in hand by Dr Giovanni Betancourt   Skin: Negative for color change, pallor, rash and wound  Allergic/Immunologic: Negative for immunocompromised state  Neurological: Negative for dizziness, seizures, syncope, facial asymmetry, speech difficulty, light-headedness and headaches  Hematological: Negative for adenopathy  Psychiatric/Behavioral: Negative for agitation, behavioral problems, confusion and decreased concentration  Current Outpatient Medications on File Prior to Visit   Medication Sig   • ALPRAZolam (XANAX) 0 5 mg tablet Take 1 tablet (0 5 mg total) by mouth 2 (two) times a day Pt states taking OD PRN   • buPROPion (WELLBUTRIN XL) 150 mg 24 hr tablet Take 1 tablet (150 mg total) by mouth in the morning   • celecoxib (CeleBREX) 200 mg capsule Take 1 capsule (200 mg total) by mouth in the morning   • cephalexin (KEFLEX) 500 mg capsule Take 4 capsules by mouth every morning PRN dental procedures -- 4 tablets morning of   • cetirizine-pseudoephedrine (ZyrTEC-D) 5-120 MG per tablet Take 1 tablet by mouth in the morning   • naproxen sodium (ALEVE) 220 MG tablet Take 220 mg by mouth daily as needed   • omeprazole (PriLOSEC) 20 mg delayed release capsule Take 20 mg by mouth in the morning   • oxymetazoline (AFRIN) 0 05 % nasal spray 2 sprays into each nostril in the morning       Objective     /78 (BP Location: Left arm, Patient Position: Sitting, Cuff Size: Standard)   Pulse 85   Temp 98 1 °F (36 7 °C) (Temporal)   Ht 5' 9\" (1 753 m)   Wt 92 6 kg (204 lb 3 2 oz)   SpO2 95%   BMI 30 16 kg/m²     Physical Exam  Vitals and nursing note reviewed  Constitutional:       General: He is not in acute distress  Appearance: Normal appearance  He is well-developed  He is not ill-appearing, toxic-appearing or diaphoretic  HENT:      Head: Normocephalic and atraumatic        Right Ear: Tympanic membrane normal       Left Ear: Tympanic membrane normal       Nose: Nose normal       " Mouth/Throat:      Mouth: Mucous membranes are moist    Eyes:      Pupils: Pupils are equal, round, and reactive to light  Neck:      Trachea: No tracheal deviation  Cardiovascular:      Rate and Rhythm: Normal rate and regular rhythm  Pulses: Normal pulses  Heart sounds: Normal heart sounds  Pulmonary:      Effort: Pulmonary effort is normal       Breath sounds: Normal breath sounds  No wheezing, rhonchi or rales  Abdominal:      General: Abdomen is flat  Palpations: Abdomen is soft  Musculoskeletal:         General: Normal range of motion  Cervical back: Normal range of motion and neck supple  Lymphadenopathy:      Cervical: No cervical adenopathy  Skin:     General: Skin is warm and dry  Neurological:      General: No focal deficit present  Mental Status: He is alert and oriented to person, place, and time  Mental status is at baseline  Psychiatric:         Mood and Affect: Mood normal          Behavior: Behavior normal          Thought Content: Thought content normal          Judgment: Judgment normal               I personally reviewed the recent (and prior)  lab results, the image studies, pathology, other specialty/physicians consult notes and recommendations, and outside medical records from other institutions, as appropriate  I had a lengthy discussion with the patient and shared the work-up findings  We discussed the diagnosis and management plan  I spent  30  minutes reviewing the records (labs, clinician notes, outside records, medical history, ordering medicine/tests/procedures, interpreting the imaging/labs previously done) and coordination of care as well as direct time with the patient today, of which greater than 50% of the time was spent in counseling and coordination of care with the patient/family        Theda Najjar, DO

## 2023-05-16 NOTE — PATIENT INSTRUCTIONS
Medicare Preventive Visit Patient Instructions  Thank you for completing your Welcome to Medicare Visit or Medicare Annual Wellness Visit today  Your next wellness visit will be due in one year (5/16/2024)  The screening/preventive services that you may require over the next 5-10 years are detailed below  Some tests may not apply to you based off risk factors and/or age  Screening tests ordered at today's visit but not completed yet may show as past due  Also, please note that scanned in results may not display below  Preventive Screenings:  Service Recommendations Previous Testing/Comments   Colorectal Cancer Screening  · Colonoscopy    · Fecal Occult Blood Test (FOBT)/Fecal Immunochemical Test (FIT)  · Fecal DNA/Cologuard Test  · Flexible Sigmoidoscopy Age: 39-70 years old   Colonoscopy: every 10 years (May be performed more frequently if at higher risk)  OR  FOBT/FIT: every 1 year  OR  Cologuard: every 3 years  OR  Sigmoidoscopy: every 5 years  Screening may be recommended earlier than age 39 if at higher risk for colorectal cancer  Also, an individualized decision between you and your healthcare provider will decide whether screening between the ages of 74-80 would be appropriate   Colonoscopy: Not on file  FOBT/FIT: Not on file  Cologuard: Not on file  Sigmoidoscopy: Not on file          Prostate Cancer Screening Individualized decision between patient and health care provider in men between ages of 53-78   Medicare will cover every 12 months beginning on the day after your 50th birthday PSA: 1 0 ng/mL     Screening Current     Hepatitis C Screening Once for adults born between 1945 and 1965  More frequently in patients at high risk for Hepatitis C Hep C Antibody: 02/10/2023    Screening Current   Diabetes Screening 1-2 times per year if you're at risk for diabetes or have pre-diabetes Fasting glucose: 93 mg/dL (2/10/2023)  A1C: No results in last 5 years (No results in last 5 years)  Screening Current Cholesterol Screening Once every 5 years if you don't have a lipid disorder  May order more often based on risk factors  Lipid panel: 02/10/2023  Screening Not Indicated  History Lipid Disorder      Other Preventive Screenings Covered by Medicare:  1  Abdominal Aortic Aneurysm (AAA) Screening: covered once if your at risk  You're considered to be at risk if you have a family history of AAA or a male between the age of 73-68 who smoking at least 100 cigarettes in your lifetime  2  Lung Cancer Screening: covers low dose CT scan once per year if you meet all of the following conditions: (1) Age 50-69; (2) No signs or symptoms of lung cancer; (3) Current smoker or have quit smoking within the last 15 years; (4) You have a tobacco smoking history of at least 20 pack years (packs per day x number of years you smoked); (5) You get a written order from a healthcare provider  3  Glaucoma Screening: covered annually if you're considered high risk: (1) You have diabetes OR (2) Family history of glaucoma OR (3)  aged 48 and older OR (3)  American aged 72 and older  3  Osteoporosis Screening: covered every 2 years if you meet one of the following conditions: (1) Have a vertebral abnormality; (2) On glucocorticoid therapy for more than 3 months; (3) Have primary hyperparathyroidism; (4) On osteoporosis medications and need to assess response to drug therapy  5  HIV Screening: covered annually if you're between the age of 12-76  Also covered annually if you are younger than 13 and older than 72 with risk factors for HIV infection  For pregnant patients, it is covered up to 3 times per pregnancy      Immunizations:  Immunization Recommendations   Influenza Vaccine Annual influenza vaccination during flu season is recommended for all persons aged >= 6 months who do not have contraindications   Pneumococcal Vaccine   * Pneumococcal conjugate vaccine = PCV13 (Prevnar 13), PCV15 (Vaxneuvance), PCV20 (Prevnar 20)  * Pneumococcal polysaccharide vaccine = PPSV23 (Pneumovax) Adults 2364 years old: 1-3 doses may be recommended based on certain risk factors  Adults 72 years old: 1-2 doses may be recommended based off what pneumonia vaccine you previously received   Hepatitis B Vaccine 3 dose series if at intermediate or high risk (ex: diabetes, end stage renal disease, liver disease)   Tetanus (Td) Vaccine - COST NOT COVERED BY MEDICARE PART B Following completion of primary series, a booster dose should be given every 10 years to maintain immunity against tetanus  Td may also be given as tetanus wound prophylaxis  Tdap Vaccine - COST NOT COVERED BY MEDICARE PART B Recommended at least once for all adults  For pregnant patients, recommended with each pregnancy  Shingles Vaccine (Shingrix) - COST NOT COVERED BY MEDICARE PART B  2 shot series recommended in those aged 48 and above     Health Maintenance Due:      Topic Date Due   • Colorectal Cancer Screening  Never done   • Hepatitis C Screening  Completed     Immunizations Due:      Topic Date Due   • COVID-19 Vaccine (1) Never done   • Pneumococcal Vaccine: 65+ Years (1 - PCV) Never done     Advance Directives   What are advance directives? Advance directives are legal documents that state your wishes and plans for medical care  These plans are made ahead of time in case you lose your ability to make decisions for yourself  Advance directives can apply to any medical decision, such as the treatments you want, and if you want to donate organs  What are the types of advance directives? There are many types of advance directives, and each state has rules about how to use them  You may choose a combination of any of the following:  · Living will: This is a written record of the treatment you want  You can also choose which treatments you do not want, which to limit, and which to stop at a certain time  This includes surgery, medicine, IV fluid, and tube feedings  · Durable power of  for healthcare [facility]): This is a written record that states who you want to make healthcare choices for you when you are unable to make them for yourself  This person, called a proxy, is usually a family member or a friend  You may choose more than 1 proxy  · Do not resuscitate (DNR) order:  A DNR order is used in case your heart stops beating or you stop breathing  It is a request not to have certain forms of treatment, such as CPR  A DNR order may be included in other types of advance directives  · Medical directive: This covers the care that you want if you are in a coma, near death, or unable to make decisions for yourself  You can list the treatments you want for each condition  Treatment may include pain medicine, surgery, blood transfusions, dialysis, IV or tube feedings, and a ventilator (breathing machine)  · Values history: This document has questions about your views, beliefs, and how you feel and think about life  This information can help others choose the care that you would choose  Why are advance directives important? An advance directive helps you control your care  Although spoken wishes may be used, it is better to have your wishes written down  Spoken wishes can be misunderstood, or not followed  Treatments may be given even if you do not want them  An advance directive may make it easier for your family to make difficult choices about your care  Weight Management   Why it is important to manage your weight:  Being overweight increases your risk of health conditions such as heart disease, high blood pressure, type 2 diabetes, and certain types of cancer  It can also increase your risk for osteoarthritis, sleep apnea, and other respiratory problems  Aim for a slow, steady weight loss  Even a small amount of weight loss can lower your risk of health problems    How to lose weight safely:  A safe and healthy way to lose weight is to eat fewer calories and get regular exercise  You can lose up about 1 pound a week by decreasing the number of calories you eat by 500 calories each day  Healthy meal plan for weight management:  A healthy meal plan includes a variety of foods, contains fewer calories, and helps you stay healthy  A healthy meal plan includes the following:  · Eat whole-grain foods more often  A healthy meal plan should contain fiber  Fiber is the part of grains, fruits, and vegetables that is not broken down by your body  Whole-grain foods are healthy and provide extra fiber in your diet  Some examples of whole-grain foods are whole-wheat breads and pastas, oatmeal, brown rice, and bulgur  · Eat a variety of vegetables every day  Include dark, leafy greens such as spinach, kale, gigi greens, and mustard greens  Eat yellow and orange vegetables such as carrots, sweet potatoes, and winter squash  · Eat a variety of fruits every day  Choose fresh or canned fruit (canned in its own juice or light syrup) instead of juice  Fruit juice has very little or no fiber  · Eat low-fat dairy foods  Drink fat-free (skim) milk or 1% milk  Eat fat-free yogurt and low-fat cottage cheese  Try low-fat cheeses such as mozzarella and other reduced-fat cheeses  · Choose meat and other protein foods that are low in fat  Choose beans or other legumes such as split peas or lentils  Choose fish, skinless poultry (chicken or turkey), or lean cuts of red meat (beef or pork)  Before you cook meat or poultry, cut off any visible fat  · Use less fat and oil  Try baking foods instead of frying them  Add less fat, such as margarine, sour cream, regular salad dressing and mayonnaise to foods  Eat fewer high-fat foods  Some examples of high-fat foods include french fries, doughnuts, ice cream, and cakes  · Eat fewer sweets  Limit foods and drinks that are high in sugar  This includes candy, cookies, regular soda, and sweetened drinks    Exercise:  Exercise at least 30 minutes per day on most days of the week  Some examples of exercise include walking, biking, dancing, and swimming  You can also fit in more physical activity by taking the stairs instead of the elevator or parking farther away from stores  Ask your healthcare provider about the best exercise plan for you  © Copyright [employer] 2018 Information is for End User's use only and may not be sold, redistributed or otherwise used for commercial purposes   All illustrations and images included in CareNotes® are the copyrighted property of [employer]  or [address] [place] [address] [employer]

## 2023-09-06 ENCOUNTER — TELEPHONE (OUTPATIENT)
Dept: ADMINISTRATIVE | Facility: OTHER | Age: 73
End: 2023-09-06

## 2023-09-06 NOTE — TELEPHONE ENCOUNTER
09/06/23 1:45 PM    Patient contacted (left message) to bring Advance Directive, POLST, or Living Will document to next scheduled pcp visit. Thank you.   Emi Moore MA  [facility]

## 2023-09-12 ENCOUNTER — OFFICE VISIT (OUTPATIENT)
Dept: FAMILY MEDICINE CLINIC | Facility: CLINIC | Age: 73
End: 2023-09-12
Payer: MEDICARE

## 2023-09-12 VITALS
HEART RATE: 78 BPM | SYSTOLIC BLOOD PRESSURE: 134 MMHG | RESPIRATION RATE: 16 BRPM | BODY MASS INDEX: 30.27 KG/M2 | TEMPERATURE: 98.1 F | WEIGHT: 204.4 LBS | OXYGEN SATURATION: 97 % | DIASTOLIC BLOOD PRESSURE: 80 MMHG | HEIGHT: 69 IN

## 2023-09-12 DIAGNOSIS — Z79.899 MEDICATION MANAGEMENT: ICD-10-CM

## 2023-09-12 DIAGNOSIS — B35.9 TINEA: ICD-10-CM

## 2023-09-12 DIAGNOSIS — Z79.899 ENCOUNTER FOR LONG-TERM (CURRENT) USE OF MEDICATIONS: ICD-10-CM

## 2023-09-12 DIAGNOSIS — F32.9 REACTIVE DEPRESSION: ICD-10-CM

## 2023-09-12 DIAGNOSIS — Z79.899 ENCOUNTER FOR LONG-TERM CURRENT USE OF HIGH RISK MEDICATION: ICD-10-CM

## 2023-09-12 DIAGNOSIS — Z71.2 ENCOUNTER TO DISCUSS TEST RESULTS: ICD-10-CM

## 2023-09-12 DIAGNOSIS — M47.812 OSTEOARTHRITIS OF CERVICAL SPINE, UNSPECIFIED SPINAL OSTEOARTHRITIS COMPLICATION STATUS: Primary | ICD-10-CM

## 2023-09-12 PROCEDURE — 99214 OFFICE O/P EST MOD 30 MIN: CPT | Performed by: FAMILY MEDICINE

## 2023-09-12 RX ORDER — KETOCONAZOLE 20 MG/G
CREAM TOPICAL DAILY
Qty: 60 G | Refills: 1 | Status: SHIPPED | OUTPATIENT
Start: 2023-09-12

## 2023-09-12 RX ORDER — ALPRAZOLAM 0.5 MG/1
0.5 TABLET ORAL 2 TIMES DAILY
Qty: 60 TABLET | Refills: 3 | Status: SHIPPED | OUTPATIENT
Start: 2023-09-12

## 2023-09-12 RX ORDER — CELECOXIB 200 MG/1
200 CAPSULE ORAL DAILY
Qty: 90 CAPSULE | Refills: 1 | Status: SHIPPED | OUTPATIENT
Start: 2023-09-12

## 2023-09-12 NOTE — PROGRESS NOTES
Name: Jimi Whitney      : 1950      MRN: [id number]  Encounter Provider: Fátima Salcedo DO  Encounter Date: 2023   Encounter department: [facility] [place]    Assessment & Plan     1. Osteoarthritis of cervical spine, unspecified spinal osteoarthritis complication status  -     celecoxib (CeleBREX) 200 mg capsule; Take 1 capsule (200 mg total) by mouth in the morning    2. Reactive depression  -     ALPRAZolam (XANAX) 0.5 mg tablet; Take 1 tablet (0.5 mg total) by mouth 2 (two) times a day Pt states taking OD PRN    3. Tinea  -     ketoconazole (NIZORAL) 2 % cream; Apply topically daily    4. Medication management    5. Encounter to discuss test results    6. Encounter for long-term current use of high risk medication    7. Encounter for long-term (current) use of medications        Subjective      HPI - 66 yo living here 1 yr -- moved here in Oct 2022 -- having difficulty with the L knee . Had R knee total replaced about 2.5 yrs ago  Lives alone, no interest in relationship. Eats 2 meals a day. Sleep is not good. Doesn't exercise - due to the L knee. Seems more depressed than usual. This past Sat, son  4 yrs ago. Also, "jock itch" in the groin and anal area. Will order Nizoral Cr. Sees Dr. Nidia Ahumada for the L knee. Saw him one mo ago. Needs TKR    Insomnia - sleeps an hr, up, TV, sleeps an hr, up and falls asleep     Review of Systems   Constitutional: Negative for activity change, appetite change, chills, diaphoresis, fatigue, fever and unexpected weight change. HENT: Negative for congestion, dental problem, drooling, ear discharge, ear pain, facial swelling, mouth sores, nosebleeds, postnasal drip, rhinorrhea, trouble swallowing and voice change. Eyes: Negative for photophobia, pain, discharge, redness, itching and visual disturbance. Respiratory: Negative for apnea, cough, choking, chest tightness and shortness of breath.     Cardiovascular: Negative for chest pain and leg swelling. Gastrointestinal: Negative for abdominal distention, abdominal pain, constipation, diarrhea and nausea. Endocrine: Negative for polydipsia, polyphagia and polyuria. Genitourinary: Negative for decreased urine volume, difficulty urinating, dysuria, enuresis and hematuria. Musculoskeletal: Positive for arthralgias. Negative for back pain, gait problem and joint swelling. Arthritis in hands and neck  Occas injection in hand by Dr. Pernell Johnson   Skin: Negative for color change, pallor, rash and wound. Allergic/Immunologic: Negative for immunocompromised state. Neurological: Negative for dizziness, seizures, syncope, facial asymmetry, speech difficulty, light-headedness and headaches. Hematological: Negative for adenopathy. Psychiatric/Behavioral: Negative for agitation, behavioral problems, confusion and decreased concentration.        Current Outpatient Medications on File Prior to Visit   Medication Sig   • buPROPion (WELLBUTRIN XL) 150 mg 24 hr tablet Take 1 tablet (150 mg total) by mouth in the morning   • cephalexin (KEFLEX) 500 mg capsule Take 4 capsules by mouth every morning PRN dental procedures -- 4 tablets morning of   • cetirizine-pseudoephedrine (ZyrTEC-D) 5-120 MG per tablet Take 1 tablet by mouth in the morning   • naproxen sodium (ALEVE) 220 MG tablet Take 220 mg by mouth daily as needed   • omeprazole (PriLOSEC) 20 mg delayed release capsule Take 20 mg by mouth in the morning   • oxymetazoline (AFRIN) 0.05 % nasal spray 2 sprays into each nostril in the morning   • [DISCONTINUED] ALPRAZolam (XANAX) 0.5 mg tablet Take 1 tablet (0.5 mg total) by mouth 2 (two) times a day Pt states taking OD PRN   • [DISCONTINUED] celecoxib (CeleBREX) 200 mg capsule Take 1 capsule (200 mg total) by mouth in the morning       Objective     /80 (BP Location: Left arm, Patient Position: Sitting, Cuff Size: Standard)   Pulse 78   Temp 98.1 °F (36.7 °C) (Temporal) Resp 16   Ht 5' 9" (1.753 m)   Wt 92.7 kg (204 lb 6.4 oz)   SpO2 97%   BMI 30.18 kg/m²     Physical Exam  Vitals and nursing note reviewed. Constitutional:       General: He is not in acute distress. Appearance: Normal appearance. He is well-developed. He is not ill-appearing, toxic-appearing or diaphoretic. HENT:      Head: Normocephalic and atraumatic. Right Ear: Tympanic membrane normal.      Left Ear: Tympanic membrane normal.      Nose: Nose normal.      Mouth/Throat:      Mouth: Mucous membranes are moist.   Eyes:      Pupils: Pupils are equal, round, and reactive to light. Neck:      Trachea: No tracheal deviation. Cardiovascular:      Rate and Rhythm: Normal rate and regular rhythm. Pulses: Normal pulses. Heart sounds: Normal heart sounds. Pulmonary:      Effort: Pulmonary effort is normal.      Breath sounds: Normal breath sounds. No wheezing, rhonchi or rales. Abdominal:      General: Abdomen is flat. Palpations: Abdomen is soft. Musculoskeletal:         General: Normal range of motion. Cervical back: Normal range of motion and neck supple. Lymphadenopathy:      Cervical: No cervical adenopathy. Skin:     General: Skin is warm and dry. Neurological:      General: No focal deficit present. Mental Status: He is alert and oriented to person, place, and time. Mental status is at baseline. Psychiatric:         Mood and Affect: Mood normal.         Behavior: Behavior normal.         Thought Content: Thought content normal.         Judgment: Judgment normal.              I personally reviewed the recent (and prior)  lab results, the image studies, pathology, other specialty/physicians consult notes and recommendations, and outside medical records from other institutions, as appropriate. I had a lengthy discussion with the patient and shared the work-up findings. We discussed the diagnosis and management plan.   I spent  35  minutes reviewing the records (labs, clinician notes, outside records, medical history, ordering medicine/tests/procedures, interpreting the imaging/labs previously done) and coordination of care as well as direct time with the patient today, of which greater than 50% of the time was spent in counseling and coordination of care with the patient/family.       Rula Tovar,

## 2023-10-31 ENCOUNTER — APPOINTMENT (OUTPATIENT)
Dept: LAB | Facility: CLINIC | Age: 73
End: 2023-10-31
Payer: MEDICARE

## 2023-10-31 DIAGNOSIS — Z01.818 OTHER SPECIFIED PRE-OPERATIVE EXAMINATION: ICD-10-CM

## 2023-10-31 DIAGNOSIS — Z01.89 RADIOLOGICAL EXAMINATION, NOT ELSEWHERE CLASSIFIED: ICD-10-CM

## 2023-10-31 LAB
ALBUMIN SERPL BCP-MCNC: 4.5 G/DL (ref 3.5–5)
ALP SERPL-CCNC: 62 U/L (ref 34–104)
ALT SERPL W P-5'-P-CCNC: 23 U/L (ref 7–52)
ANION GAP SERPL CALCULATED.3IONS-SCNC: 6 MMOL/L
AST SERPL W P-5'-P-CCNC: 20 U/L (ref 13–39)
BASOPHILS # BLD AUTO: 0.05 THOUSANDS/ÂΜL (ref 0–0.1)
BASOPHILS NFR BLD AUTO: 0 % (ref 0–1)
BILIRUB SERPL-MCNC: 0.71 MG/DL (ref 0.2–1)
BUN SERPL-MCNC: 27 MG/DL (ref 5–25)
CALCIUM SERPL-MCNC: 9.8 MG/DL (ref 8.4–10.2)
CHLORIDE SERPL-SCNC: 103 MMOL/L (ref 96–108)
CO2 SERPL-SCNC: 28 MMOL/L (ref 21–32)
CREAT SERPL-MCNC: 0.98 MG/DL (ref 0.6–1.3)
EOSINOPHIL # BLD AUTO: 0.01 THOUSAND/ÂΜL (ref 0–0.61)
EOSINOPHIL NFR BLD AUTO: 0 % (ref 0–6)
ERYTHROCYTE [DISTWIDTH] IN BLOOD BY AUTOMATED COUNT: 14.6 % (ref 11.6–15.1)
GFR SERPL CREATININE-BSD FRML MDRD: 76 ML/MIN/1.73SQ M
GLUCOSE P FAST SERPL-MCNC: 101 MG/DL (ref 65–99)
HCT VFR BLD AUTO: 49.1 % (ref 36.5–49.3)
HGB BLD-MCNC: 16.3 G/DL (ref 12–17)
IMM GRANULOCYTES # BLD AUTO: 0.09 THOUSAND/UL (ref 0–0.2)
IMM GRANULOCYTES NFR BLD AUTO: 1 % (ref 0–2)
LYMPHOCYTES # BLD AUTO: 1.16 THOUSANDS/ÂΜL (ref 0.6–4.47)
LYMPHOCYTES NFR BLD AUTO: 8 % (ref 14–44)
MCH RBC QN AUTO: 28.6 PG (ref 26.8–34.3)
MCHC RBC AUTO-ENTMCNC: 33.2 G/DL (ref 31.4–37.4)
MCV RBC AUTO: 86 FL (ref 82–98)
MONOCYTES # BLD AUTO: 1.22 THOUSAND/ÂΜL (ref 0.17–1.22)
MONOCYTES NFR BLD AUTO: 8 % (ref 4–12)
NEUTROPHILS # BLD AUTO: 12.84 THOUSANDS/ÂΜL (ref 1.85–7.62)
NEUTS SEG NFR BLD AUTO: 83 % (ref 43–75)
NRBC BLD AUTO-RTO: 0 /100 WBCS
PLATELET # BLD AUTO: 318 THOUSANDS/UL (ref 149–390)
PMV BLD AUTO: 9.2 FL (ref 8.9–12.7)
POTASSIUM SERPL-SCNC: 4.1 MMOL/L (ref 3.5–5.3)
PROT SERPL-MCNC: 8 G/DL (ref 6.4–8.4)
RBC # BLD AUTO: 5.7 MILLION/UL (ref 3.88–5.62)
SODIUM SERPL-SCNC: 137 MMOL/L (ref 135–147)
WBC # BLD AUTO: 15.37 THOUSAND/UL (ref 4.31–10.16)

## 2023-10-31 PROCEDURE — 85025 COMPLETE CBC W/AUTO DIFF WBC: CPT

## 2023-10-31 PROCEDURE — 80053 COMPREHEN METABOLIC PANEL: CPT

## 2023-10-31 PROCEDURE — 36415 COLL VENOUS BLD VENIPUNCTURE: CPT

## 2023-11-01 ENCOUNTER — RA CDI HCC (OUTPATIENT)
Dept: OTHER | Facility: HOSPITAL | Age: 73
End: 2023-11-01

## 2023-11-02 ENCOUNTER — CONSULT (OUTPATIENT)
Dept: FAMILY MEDICINE CLINIC | Facility: CLINIC | Age: 73
End: 2023-11-02
Payer: MEDICARE

## 2023-11-02 VITALS
HEART RATE: 91 BPM | SYSTOLIC BLOOD PRESSURE: 146 MMHG | HEIGHT: 69 IN | DIASTOLIC BLOOD PRESSURE: 90 MMHG | TEMPERATURE: 98.3 F | BODY MASS INDEX: 30.63 KG/M2 | WEIGHT: 206.8 LBS | OXYGEN SATURATION: 97 %

## 2023-11-02 DIAGNOSIS — Z71.2 ENCOUNTER TO DISCUSS TEST RESULTS: ICD-10-CM

## 2023-11-02 DIAGNOSIS — Z79.899 MEDICATION MANAGEMENT: ICD-10-CM

## 2023-11-02 DIAGNOSIS — J01.01 ACUTE RECURRENT MAXILLARY SINUSITIS: ICD-10-CM

## 2023-11-02 DIAGNOSIS — Z01.818 PRE-OP EVALUATION: Primary | ICD-10-CM

## 2023-11-02 DIAGNOSIS — D72.829 LEUKOCYTOSIS, UNSPECIFIED TYPE: ICD-10-CM

## 2023-11-02 PROCEDURE — 99215 OFFICE O/P EST HI 40 MIN: CPT | Performed by: FAMILY MEDICINE

## 2023-11-02 RX ORDER — AMOXICILLIN 875 MG/1
875 TABLET, COATED ORAL 2 TIMES DAILY
Qty: 20 TABLET | Refills: 0 | Status: SHIPPED | OUTPATIENT
Start: 2023-11-02 | End: 2023-11-12
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
## 2023-11-05 PROCEDURE — 93000 ELECTROCARDIOGRAM COMPLETE: CPT | Performed by: FAMILY MEDICINE

## 2023-11-08 ENCOUNTER — TELEPHONE (OUTPATIENT)
Age: 73
End: 2023-11-08

## 2023-11-08 NOTE — TELEPHONE ENCOUNTER
Refaxed pre op clearance CN at this time w cover sheet ATTMELBA cabrera from [address] [place] [address].

## 2023-11-08 NOTE — TELEPHONE ENCOUNTER
Xuan Carr from [address] [place] [address] called they received the medical clearance form  for the patients surgery on  11/28/23    on the form it said pat reviewed and the answer circled was  no  ---   it has to be changed to yes  and refaxed to them at [phone]     any questions call deborah at [address]   thank you

## 2023-11-13 ENCOUNTER — EVALUATION (OUTPATIENT)
Dept: PHYSICAL THERAPY | Facility: CLINIC | Age: 73
End: 2023-11-13
Payer: MEDICARE

## 2023-11-13 ENCOUNTER — APPOINTMENT (OUTPATIENT)
Dept: LAB | Facility: CLINIC | Age: 73
End: 2023-11-13
Payer: MEDICARE

## 2023-11-13 DIAGNOSIS — J01.01 ACUTE RECURRENT MAXILLARY SINUSITIS: ICD-10-CM

## 2023-11-13 DIAGNOSIS — D72.829 LEUKOCYTOSIS, UNSPECIFIED TYPE: ICD-10-CM

## 2023-11-13 DIAGNOSIS — Z01.818 PRE-OP EVALUATION: ICD-10-CM

## 2023-11-13 DIAGNOSIS — G89.29 CHRONIC PAIN OF LEFT KNEE: Primary | ICD-10-CM

## 2023-11-13 DIAGNOSIS — M25.562 CHRONIC PAIN OF LEFT KNEE: Primary | ICD-10-CM

## 2023-11-13 LAB
BASOPHILS # BLD AUTO: 0.07 THOUSANDS/ÂΜL (ref 0–0.1)
BASOPHILS NFR BLD AUTO: 1 % (ref 0–1)
CRP SERPL QL: 7.8 MG/L
EOSINOPHIL # BLD AUTO: 0.12 THOUSAND/ÂΜL (ref 0–0.61)
EOSINOPHIL NFR BLD AUTO: 1 % (ref 0–6)
ERYTHROCYTE [DISTWIDTH] IN BLOOD BY AUTOMATED COUNT: 14.1 % (ref 11.6–15.1)
ERYTHROCYTE [SEDIMENTATION RATE] IN BLOOD: 24 MM/HOUR (ref 0–19)
HCT VFR BLD AUTO: 48.6 % (ref 36.5–49.3)
HGB BLD-MCNC: 16.2 G/DL (ref 12–17)
IMM GRANULOCYTES # BLD AUTO: 0.02 THOUSAND/UL (ref 0–0.2)
IMM GRANULOCYTES NFR BLD AUTO: 0 % (ref 0–2)
LYMPHOCYTES # BLD AUTO: 1.21 THOUSANDS/ÂΜL (ref 0.6–4.47)
LYMPHOCYTES NFR BLD AUTO: 14 % (ref 14–44)
MCH RBC QN AUTO: 28.6 PG (ref 26.8–34.3)
MCHC RBC AUTO-ENTMCNC: 33.3 G/DL (ref 31.4–37.4)
MCV RBC AUTO: 86 FL (ref 82–98)
MONOCYTES # BLD AUTO: 0.92 THOUSAND/ÂΜL (ref 0.17–1.22)
MONOCYTES NFR BLD AUTO: 11 % (ref 4–12)
NEUTROPHILS # BLD AUTO: 6.37 THOUSANDS/ÂΜL (ref 1.85–7.62)
NEUTS SEG NFR BLD AUTO: 73 % (ref 43–75)
NRBC BLD AUTO-RTO: 0 /100 WBCS
PLATELET # BLD AUTO: 277 THOUSANDS/UL (ref 149–390)
PMV BLD AUTO: 9.4 FL (ref 8.9–12.7)
RBC # BLD AUTO: 5.67 MILLION/UL (ref 3.88–5.62)
WBC # BLD AUTO: 8.71 THOUSAND/UL (ref 4.31–10.16)

## 2023-11-13 PROCEDURE — 86140 C-REACTIVE PROTEIN: CPT

## 2023-11-13 PROCEDURE — 85025 COMPLETE CBC W/AUTO DIFF WBC: CPT

## 2023-11-13 PROCEDURE — 85652 RBC SED RATE AUTOMATED: CPT

## 2023-11-13 PROCEDURE — 97161 PT EVAL LOW COMPLEX 20 MIN: CPT | Performed by: PHYSICAL THERAPIST

## 2023-11-13 PROCEDURE — 36415 COLL VENOUS BLD VENIPUNCTURE: CPT

## 2023-11-13 PROCEDURE — 97110 THERAPEUTIC EXERCISES: CPT | Performed by: PHYSICAL THERAPIST

## 2023-11-13 NOTE — PROGRESS NOTES
PT Evaluation    Today's date: 2023  Patient name: Kayal Hood  : 1950  MRN: [id number]  Referring provider: No ref. provider found  Dx: No diagnosis found. Subjective Evaluation     History of Present Illness    Patient presents pre-operatively for a L LTR on  7679 by Dr. Jos Nelson. He has had knee pain for 3 years. He had menisectomies in both knees many years ago. He has a lot of pain going up and down the [place] in the [employer]. He did have the R knee done 2 years ago. He states he still does not have full flexibility. Neuro signs: none  Red flags: none  Occupation: retired installing x-ray Ct-scan      Pain  At best pain ratin  At worst pain ratin  Location: L anterior knee medially    Relieving factors: nothing, squats  Aggravating factors: walking, stairs    Social Support  Lives by himself in an apartment s any steps. He will be staying with his niece for a little while postoperatively. Patient Goals  Patient goals for therapy: walking c less pain,     STGs  1. Decrease pain by 20% in 2-4 weeks. 2. Improve knee ROM from 0-90 degrees in 2-4 weeks. 3. Improve knee strength by 1/3 grade in 2-4 weeks to perform sit to stand independently s UE. 4. Perform stairs in step to pattern c rail in 4 weeks. LTGs  1. Decrease pain by 60% in 6-8 weeks. 2. Improve walking tolerance to >30 minutes in 6-8 weeks to perform community ambulation. 3. Perform job/dressing/showering activities without pain in 6-8 weeks.     4. Return to driving activities    Objective Measurements:  Observation:      TU sec   Gait: good pace and slight pain in LLE  Functional squat: good depth R knee pain  Slump: tightness        Knee A/PROM L R Strength L R   Flex   118/120  /100 Flex 5 5   Ext  0/0  0/0 Ext 5 5           Hip A/PROM WFL       Flex  /  / Flex 5 5   ER at 90   ER     IR at 90   IR     Abd   Abd     Ext   Ext             Lumbar AROM   Ankle     Flex   DF     Ext PF           Assessment:    Barb Castillo is a pleasant 68 y.o. male who presents with L knee pain limiting his ability to walk longer distances. He has deficits in hip strength, decreased hip flexor flexibility and decreased endurance. The patient's greatest concern is improving pain and he would like to have surgery on 11/28 for the L knee. He states he does have an upcoming hand surgery as well. No further referral appears necessary at this time based upon examination results. Etiologic factors include repetitive wear and tear. Negative prognostic indicators: lives alone. Positive prognostic indicators: good attitude. Please contact me if you have any further questions or recommendations. Thank you very much for the kind referral.        Plan  Patient would benefit from:Skilled physical therapy. Planned therapy interventions: manual therapy, neuromuscular re-education, stretching, strengthening, therapeutic activities, therapeutic exercise, patient education, home exercise program, and activity modification.     Frequency: 2x week  Duration in weeks: 8  Treatment plan discussed with: patient         Goal: Patient's goal is to improve knee tightness    Precautions: hx of R TKR  Dx: L TKR 11/28    Daily Treatment Diary  Manuals 11/13/2023        L knee prom                                    Ther Ex         Bike         Gastroc slant bd         TKE         Heel slides         bridges                                             Neuro Re-ed         Quad set 10x        saq         laq 10x        slr                  Ther Activity         stairs 1 flight        minisquats 10x                 Gait Training         Treadmill WBAT                  Modalities         CP in elevation

## 2023-11-30 ENCOUNTER — APPOINTMENT (OUTPATIENT)
Dept: PHYSICAL THERAPY | Facility: CLINIC | Age: 73
End: 2023-11-30
Payer: MEDICARE

## 2023-12-27 ENCOUNTER — APPOINTMENT (EMERGENCY)
Dept: RADIOLOGY | Facility: HOSPITAL | Age: 73
End: 2023-12-27
Payer: MEDICARE

## 2023-12-27 ENCOUNTER — HOSPITAL ENCOUNTER (EMERGENCY)
Facility: HOSPITAL | Age: 73
Discharge: HOME | End: 2023-12-28
Attending: EMERGENCY MEDICINE
Payer: MEDICARE

## 2023-12-27 DIAGNOSIS — K40.90 LEFT INGUINAL HERNIA: ICD-10-CM

## 2023-12-27 DIAGNOSIS — K57.90 DIVERTICULOSIS: ICD-10-CM

## 2023-12-27 DIAGNOSIS — R10.9 ABDOMINAL PAIN: Primary | ICD-10-CM

## 2023-12-27 DIAGNOSIS — R19.7 DIARRHEA: ICD-10-CM

## 2023-12-27 LAB
ALBUMIN SERPL BCP-MCNC: 4.2 G/DL (ref 3.5–5)
ALP SERPL-CCNC: 77 U/L (ref 34–104)
ALT SERPL W P-5'-P-CCNC: 20 U/L (ref 7–52)
ANION GAP SERPL CALCULATED.3IONS-SCNC: 7 MMOL/L
AST SERPL W P-5'-P-CCNC: 18 U/L (ref 13–39)
BASOPHILS # BLD AUTO: 0.11 THOUSANDS/ÂΜL (ref 0–0.1)
BASOPHILS NFR BLD AUTO: 2 % (ref 0–1)
BILIRUB SERPL-MCNC: 0.43 MG/DL (ref 0.2–1)
BUN SERPL-MCNC: 20 MG/DL (ref 5–25)
CALCIUM SERPL-MCNC: 9.6 MG/DL (ref 8.4–10.2)
CHLORIDE SERPL-SCNC: 104 MMOL/L (ref 96–108)
CO2 SERPL-SCNC: 26 MMOL/L (ref 21–32)
CREAT SERPL-MCNC: 0.85 MG/DL (ref 0.6–1.3)
EOSINOPHIL # BLD AUTO: 0.54 THOUSAND/ÂΜL (ref 0–0.61)
EOSINOPHIL NFR BLD AUTO: 7 % (ref 0–6)
ERYTHROCYTE [DISTWIDTH] IN BLOOD BY AUTOMATED COUNT: 14.6 % (ref 11.6–15.1)
GFR SERPL CREATININE-BSD FRML MDRD: 86 ML/MIN/1.73SQ M
GLUCOSE SERPL-MCNC: 107 MG/DL (ref 65–140)
HCT VFR BLD AUTO: 43.2 % (ref 36.5–49.3)
HGB BLD-MCNC: 14.2 G/DL (ref 12–17)
IMM GRANULOCYTES # BLD AUTO: 0.02 THOUSAND/UL (ref 0–0.2)
IMM GRANULOCYTES NFR BLD AUTO: 0 % (ref 0–2)
LIPASE SERPL-CCNC: 26 U/L (ref 11–82)
LYMPHOCYTES # BLD AUTO: 1.45 THOUSANDS/ÂΜL (ref 0.6–4.47)
LYMPHOCYTES NFR BLD AUTO: 19 % (ref 14–44)
MCH RBC QN AUTO: 28.5 PG (ref 26.8–34.3)
MCHC RBC AUTO-ENTMCNC: 32.9 G/DL (ref 31.4–37.4)
MCV RBC AUTO: 87 FL (ref 82–98)
MONOCYTES # BLD AUTO: 0.88 THOUSAND/ÂΜL (ref 0.17–1.22)
MONOCYTES NFR BLD AUTO: 12 % (ref 4–12)
NEUTROPHILS # BLD AUTO: 4.47 THOUSANDS/ÂΜL (ref 1.85–7.62)
NEUTS SEG NFR BLD AUTO: 60 % (ref 43–75)
NRBC BLD AUTO-RTO: 0 /100 WBCS
PLATELET # BLD AUTO: 292 THOUSANDS/UL (ref 149–390)
PMV BLD AUTO: 9.4 FL (ref 8.9–12.7)
POTASSIUM SERPL-SCNC: 3.6 MMOL/L (ref 3.5–5.3)
PROT SERPL-MCNC: 7.6 G/DL (ref 6.4–8.4)
RBC # BLD AUTO: 4.98 MILLION/UL (ref 3.88–5.62)
SODIUM SERPL-SCNC: 137 MMOL/L (ref 135–147)
WBC # BLD AUTO: 7.47 THOUSAND/UL (ref 4.31–10.16)

## 2023-12-27 PROCEDURE — 99285 EMERGENCY DEPT VISIT HI MDM: CPT | Performed by: EMERGENCY MEDICINE

## 2023-12-27 PROCEDURE — 96360 HYDRATION IV INFUSION INIT: CPT

## 2023-12-27 PROCEDURE — 85025 COMPLETE CBC W/AUTO DIFF WBC: CPT | Performed by: EMERGENCY MEDICINE

## 2023-12-27 PROCEDURE — 93005 ELECTROCARDIOGRAM TRACING: CPT

## 2023-12-27 PROCEDURE — 99284 EMERGENCY DEPT VISIT MOD MDM: CPT

## 2023-12-27 PROCEDURE — 80053 COMPREHEN METABOLIC PANEL: CPT | Performed by: EMERGENCY MEDICINE

## 2023-12-27 PROCEDURE — 83690 ASSAY OF LIPASE: CPT | Performed by: EMERGENCY MEDICINE

## 2023-12-27 PROCEDURE — 36415 COLL VENOUS BLD VENIPUNCTURE: CPT

## 2023-12-27 PROCEDURE — 71045 X-RAY EXAM CHEST 1 VIEW: CPT

## 2023-12-27 RX ORDER — SODIUM CHLORIDE 9 MG/ML
150 INJECTION, SOLUTION INTRAVENOUS CONTINUOUS
Status: DISCONTINUED | OUTPATIENT
Start: 2023-12-27 | End: 2023-12-28

## 2023-12-27 RX ADMIN — SODIUM CHLORIDE 1000 ML: 0.9 INJECTION, SOLUTION INTRAVENOUS at 23:54

## 2023-12-28 ENCOUNTER — APPOINTMENT (EMERGENCY)
Dept: CT IMAGING | Facility: HOSPITAL | Age: 73
End: 2023-12-28
Payer: MEDICARE

## 2023-12-28 ENCOUNTER — TELEPHONE (OUTPATIENT)
Dept: FAMILY MEDICINE CLINIC | Facility: CLINIC | Age: 73
End: 2023-12-28

## 2023-12-28 VITALS
SYSTOLIC BLOOD PRESSURE: 182 MMHG | OXYGEN SATURATION: 98 % | HEART RATE: 79 BPM | DIASTOLIC BLOOD PRESSURE: 94 MMHG | RESPIRATION RATE: 18 BRPM | TEMPERATURE: 97.8 F

## 2023-12-28 LAB
ATRIAL RATE: 72 BPM
BILIRUB UR QL STRIP: NEGATIVE
CLARITY UR: CLEAR
COLOR UR: NORMAL
GLUCOSE UR STRIP-MCNC: NEGATIVE MG/DL
HGB UR QL STRIP.AUTO: NEGATIVE
KETONES UR STRIP-MCNC: NEGATIVE MG/DL
LEUKOCYTE ESTERASE UR QL STRIP: NEGATIVE
NITRITE UR QL STRIP: NEGATIVE
P AXIS: 46 DEGREES
PH UR STRIP.AUTO: 5.5 [PH]
PR INTERVAL: 198 MS
PROT UR STRIP-MCNC: NEGATIVE MG/DL
QRS AXIS: 5 DEGREES
QRSD INTERVAL: 90 MS
QT INTERVAL: 374 MS
QTC INTERVAL: 409 MS
SP GR UR STRIP.AUTO: 1.02 (ref 1–1.03)
T WAVE AXIS: 53 DEGREES
UROBILINOGEN UR STRIP-ACNC: <2 MG/DL
VENTRICULAR RATE: 72 BPM

## 2023-12-28 PROCEDURE — 74177 CT ABD & PELVIS W/CONTRAST: CPT

## 2023-12-28 PROCEDURE — G1004 CDSM NDSC: HCPCS

## 2023-12-28 PROCEDURE — 81003 URINALYSIS AUTO W/O SCOPE: CPT | Performed by: EMERGENCY MEDICINE

## 2023-12-28 PROCEDURE — 96361 HYDRATE IV INFUSION ADD-ON: CPT

## 2023-12-28 RX ADMIN — IOHEXOL 100 ML: 350 INJECTION, SOLUTION INTRAVENOUS at 00:50

## 2023-12-28 NOTE — ED PROVIDER NOTES
History  Chief Complaint   Patient presents with    Abdominal Pain     Pt states he has been having ongoing L sided abdominal pain. Pt reports hx of hernia surgery with mesh repair. Denies N/V, CP, SOB or other sx at this time.      Patient is a 73 year old male with 1 month of intermittent left inguinal pain and had recent CT which showed left inguinal hernia. Had knee surgery 1 month ago. Was on pain medication which caused constipation and then patient took medication for constipation and has had diarrhea for past 2 days. No travel. No urinary sx. Has had prior R inguinal hernia repair and appy. No fever. No N/V. No GI bleeding. No known ill contacts. No raw meat, eggs, fish or recent abx use. Declines pain medication. Was last seen at  [facility] in [place] on 9/12/23 for osteoarthritis of cervical spine. [facility] website checked on this patient and last Rx filled was on 12/15/23 for oxycodone for 7 day supply.       History provided by:  Patient   used: No    Abdominal Pain  Associated symptoms: constipation (prior) and diarrhea    Associated symptoms: no fever, no nausea and no vomiting        Prior to Admission Medications   Prescriptions Last Dose Informant Patient Reported? Taking?   ALPRAZolam (XANAX) 0.5 mg tablet  Self No No   Sig: Take 1 tablet (0.5 mg total) by mouth 2 (two) times a day Pt states taking OD PRN   buPROPion (WELLBUTRIN XL) 150 mg 24 hr tablet  Self No No   Sig: Take 1 tablet (150 mg total) by mouth in the morning   celecoxib (CeleBREX) 200 mg capsule  Self No No   Sig: Take 1 capsule (200 mg total) by mouth in the morning   cephalexin (KEFLEX) 500 mg capsule  Self Yes No   Sig: Take 4 capsules by mouth every morning PRN dental procedures -- 4 tablets morning of   cetirizine-pseudoephedrine (ZyrTEC-D) 5-120 MG per tablet  Self Yes No   Sig: Take 1 tablet by mouth in the morning   ketoconazole (NIZORAL) 2 % cream  Self No No   Sig: Apply topically daily   naproxen  sodium (ALEVE) 220 MG tablet  Self Yes No   Sig: Take 220 mg by mouth daily as needed   omeprazole (PriLOSEC) 20 mg delayed release capsule  Self Yes No   Sig: Take 20 mg by mouth in the morning   oxymetazoline (AFRIN) 0.05 % nasal spray  Self Yes No   Si sprays into each nostril in the morning      Facility-Administered Medications: None       Past Medical History:   Diagnosis Date    Anxiety     Depression        Past Surgical History:   Procedure Laterality Date    APPENDECTOMY      HERNIA REPAIR      KNEE ARTHROSCOPY Bilateral     REPLACEMENT TOTAL KNEE Right     WRIST SURGERY Left     L. Wrist--removed small bone and replaced w. tendon due to arthritis       Family History   Problem Relation Age of Onset    Lung disease Mother          at 89    Crohn's disease Father     Diabetes Sister     Heart failure Sister      I have reviewed and agree with the history as documented.    E-Cigarette/Vaping    E-Cigarette Use Never User      E-Cigarette/Vaping Substances     Social History     Tobacco Use    Smoking status: Never    Smokeless tobacco: Never   Vaping Use    Vaping status: Never Used   Substance Use Topics    Alcohol use: Yes     Alcohol/week: 2.0 standard drinks of alcohol     Types: 2 Glasses of wine per week     Comment: 1 glass wine/3 nights    Drug use: Never       Review of Systems   Constitutional:  Negative for fever.   Gastrointestinal:  Positive for abdominal pain, constipation (prior) and diarrhea. Negative for blood in stool, nausea and vomiting.   Genitourinary:  Negative for difficulty urinating.   All other systems reviewed and are negative.      Physical Exam  Physical Exam  Vitals and nursing note reviewed.   Constitutional:       General: He is in acute distress (moderate).   HENT:      Head: Normocephalic and atraumatic.      Mouth/Throat:      Mouth: Mucous membranes are moist.   Eyes:      General: No scleral icterus.  Cardiovascular:      Rate and Rhythm: Normal rate and  regular rhythm.      Heart sounds: Normal heart sounds. No murmur heard.  Pulmonary:      Effort: Pulmonary effort is normal. No respiratory distress.      Breath sounds: Normal breath sounds.   Abdominal:      General: Bowel sounds are normal. There is no distension.      Palpations: Abdomen is soft.      Tenderness: There is abdominal tenderness (diffuse bilateral lower). There is no guarding or rebound.      Hernia: No hernia (No incarcerated left inguinal noted) is present.   Musculoskeletal:         General: No deformity.      Cervical back: Normal range of motion and neck supple.      Right lower leg: No edema.      Left lower leg: No edema.   Skin:     General: Skin is warm and dry.      Findings: No erythema or rash.   Neurological:      General: No focal deficit present.      Mental Status: He is alert and oriented to person, place, and time.   Psychiatric:         Mood and Affect: Mood normal.         Vital Signs  ED Triage Vitals [12/27/23 2229]   Temperature Pulse Respirations Blood Pressure SpO2   97.8 °F (36.6 °C) 83 18 167/93 97 %      Temp Source Heart Rate Source Patient Position - Orthostatic VS BP Location FiO2 (%)   Oral Monitor Sitting Right arm --      Pain Score       --           Vitals:    12/27/23 2229 12/28/23 0000 12/28/23 0030   BP: 167/93 (!) 177/94 (!) 182/94   Pulse: 83 72 79   Patient Position - Orthostatic VS: Sitting Sitting Sitting         Visual Acuity      ED Medications  Medications   sodium chloride 0.9 % infusion (has no administration in time range)   sodium chloride 0.9 % bolus 1,000 mL (1,000 mL Intravenous New Bag 12/27/23 6770)   iohexol (OMNIPAQUE) 350 MG/ML injection (MULTI-DOSE) 100 mL (100 mL Intravenous Given 12/28/23 0050)       Diagnostic Studies  Results Reviewed       Procedure Component Value Units Date/Time    UA w Reflex to Microscopic w Reflex to Culture [[id number]] Collected: 12/28/23 0026    Lab Status: Final result Specimen: Urine, Clean Catch Updated:  12/28/23 0037     Color, UA Light Yellow     Clarity, UA Clear     Specific Gravity, UA 1.021     pH, UA 5.5     Leukocytes, UA Negative     Nitrite, UA Negative     Protein, UA Negative mg/dl      Glucose, UA Negative mg/dl      Ketones, UA Negative mg/dl      Urobilinogen, UA <2.0 mg/dl      Bilirubin, UA Negative     Occult Blood, UA Negative    Comprehensive metabolic panel [[id number]] Collected: 12/27/23 2233    Lab Status: Final result Specimen: Blood from Arm, Right Updated: 12/27/23 2311     Sodium 137 mmol/L      Potassium 3.6 mmol/L      Chloride 104 mmol/L      CO2 26 mmol/L      ANION GAP 7 mmol/L      BUN 20 mg/dL      Creatinine 0.85 mg/dL      Glucose 107 mg/dL      Calcium 9.6 mg/dL      AST 18 U/L      ALT 20 U/L      Alkaline Phosphatase 77 U/L      Total Protein 7.6 g/dL      Albumin 4.2 g/dL      Total Bilirubin 0.43 mg/dL      eGFR 86 ml/min/1.73sq m     Narrative:      National Kidney Disease Foundation guidelines for Chronic Kidney Disease (CKD):     Stage 1 with normal or high GFR (GFR > 90 mL/min/1.73 square meters)    Stage 2 Mild CKD (GFR = 60-89 mL/min/1.73 square meters)    Stage 3A Moderate CKD (GFR = 45-59 mL/min/1.73 square meters)    Stage 3B Moderate CKD (GFR = 30-44 mL/min/1.73 square meters)    Stage 4 Severe CKD (GFR = 15-29 mL/min/1.73 square meters)    Stage 5 End Stage CKD (GFR <15 mL/min/1.73 square meters)  Note: GFR calculation is accurate only with a steady state creatinine    Lipase [[id number]]  (Normal) Collected: 12/27/23 2233    Lab Status: Final result Specimen: Blood from Arm, Right Updated: 12/27/23 2311     Lipase 26 u/L     CBC and differential [[id number]]  (Abnormal) Collected: 12/27/23 2233    Lab Status: Final result Specimen: Blood from Arm, Right Updated: 12/27/23 2257     WBC 7.47 Thousand/uL      RBC 4.98 Million/uL      Hemoglobin 14.2 g/dL      Hematocrit 43.2 %      MCV 87 fL      MCH 28.5 pg      MCHC 32.9 g/dL      RDW 14.6 %      MPV 9.4 fL       Platelets 292 Thousands/uL      nRBC 0 /100 WBCs      Neutrophils Relative 60 %      Immat GRANS % 0 %      Lymphocytes Relative 19 %      Monocytes Relative 12 %      Eosinophils Relative 7 %      Basophils Relative 2 %      Neutrophils Absolute 4.47 Thousands/µL      Immature Grans Absolute 0.02 Thousand/uL      Lymphocytes Absolute 1.45 Thousands/µL      Monocytes Absolute 0.88 Thousand/µL      Eosinophils Absolute 0.54 Thousand/µL      Basophils Absolute 0.11 Thousands/µL                    CT abdomen pelvis with contrast   ED Interpretation by Elmer Moya MD (12/28 0127)   FINDINGS:     ABDOMEN     LOWER CHEST: Emphysema.     LIVER/BILIARY TREE:  One or more subcentimeter sharply circumscribed low-density hepatic lesion(s) are noted, too small to accurately characterize, but statistically most likely to represent subcentimeter hepatic cysts.  No suspicious solid hepatic   lesion is identified.  Hepatic contours are normal.  No biliary dilatation.     GALLBLADDER:  No calcified gallstones. No pericholecystic inflammatory change.     SPLEEN:  Unremarkable.     PANCREAS:  Unremarkable.     ADRENAL GLANDS:  Unremarkable.     KIDNEYS/URETERS:  No hydronephrosis or urinary tract calculus.  One or more sharply circumscribed subcentimeter renal hypodensities are present, too small to accurately characterize, and statistically most likely benign findings. According to recent   literature (Radiology 2019) no further workup of these findings is recommended.     STOMACH AND BOWEL:  There is colonic diverticulosis without evidence of acute diverticulitis.     APPEND   IX:  There are expected postoperative changes of appendectomy.     ABDOMINOPELVIC CAVITY:  No ascites.  No pneumoperitoneum.  No lymphadenopathy.     VESSELS:  Unremarkable for patient's age.     PELVIS     REPRODUCTIVE ORGANS:  The prostate is enlarged.     URINARY BLADDER:  Unremarkable.     ABDOMINAL WALL/INGUINAL REGIONS: Small fat-containing left  inguinal hernia. Prior right inguinal hernia repair.     OSSEOUS STRUCTURES:  No acute fracture or destructive osseous lesion.     IMPRESSION:     No acute abnormality or suspicious mass.           Workstation performed: [id number]           Final Result by Dennis Cuba DO (12/28 0120)      No acute abnormality or suspicious mass.            Workstation performed: [id number]         XR chest 1 view portable   ED Interpretation by Elmer Moya MD (12/28 0045)   No acute disease read by me.                  Procedures  ECG 12 Lead Documentation Only    Date/Time: 12/27/2023 11:57 PM    Performed by: Elmer Moya MD  Authorized by: Elmer Moya MD    Indications / Diagnosis:  Abd pain  ECG reviewed by me, the ED Provider: yes    Patient location:  ED  Previous ECG:     Previous ECG:  Unavailable  Interpretation:     Interpretation: normal    Rate:     ECG rate:  72    ECG rate assessment: normal    Rhythm:     Rhythm: sinus rhythm    Ectopy:     Ectopy: none    QRS:     QRS axis:  Normal    QRS intervals:  Normal  Conduction:     Conduction: normal    ST segments:     ST segments:  Normal  T waves:     T waves: normal             ED Course  ED Course as of 12/28/23 0136   Thu Dec 28, 2023   0132 Labs, EKG, CXR and CT d/w patient. No acute abdomen prior to discharge .                               SBIRT 22yo+      Flowsheet Row Most Recent Value   Initial Alcohol Screen: US AUDIT-C     1. How often do you have a drink containing alcohol? 0 Filed at: 12/28/2023 0115   2. How many drinks containing alcohol do you have on a typical day you are drinking?  0 Filed at: 12/28/2023 0115   3a. Male UNDER 65: How often do you have five or more drinks on one occasion? 0 Filed at: 12/28/2023 0115   3b. FEMALE Any Age, or MALE 65+: How often do you have 4 or more drinks on one occassion? 0 Filed at: 12/28/2023 0115   Audit-C Score 0 Filed at: 12/28/2023 0115   LEVI: How many times in the past year have you...     Used an illegal drug or used a prescription medication for non-medical reasons? Never Filed at: 12/28/2023 0115                      Medical Decision Making  DDx including but not limited to: appendicitis, gastroenteritis, gastritis, PUD, GERD, gastroparesis, hepatitis, pancreatitis, colitis, enteritis, diverticulitis, food poisoning, epiploic appendagitis, mesenteric adenitis, mesenteric panniculitis, mesenteric ischemia, IBD, IBS, ileus, bowel obstruction, volvulus, internal hernia, cholecystitis, biliary colic, choledocholithiasis, perforated viscus, tumor, splenic etiology, constipation, AAA, doubt testicular torsion; renal colic, pyelonephritis, UTI; doubt cardiac etiology.       Amount and/or Complexity of Data Reviewed  Labs: ordered. Decision-making details documented in ED Course.  Radiology: ordered and independent interpretation performed. Decision-making details documented in ED Course.  ECG/medicine tests: ordered and independent interpretation performed. Decision-making details documented in ED Course.    Risk  Prescription drug management.  Decision regarding hospitalization.             Disposition  Final diagnoses:   Abdominal pain   Diarrhea   Left inguinal hernia   Diverticulosis     Time reflects when diagnosis was documented in both MDM as applicable and the Disposition within this note       Time User Action Codes Description Comment    12/28/2023  1:35 AM Elmer Moya S Add [R10.9] Abdominal pain     12/28/2023  1:35 AM Elmer Moya S Add [R19.7] Diarrhea     12/28/2023  1:35 AM Elmer Moya S Add [K40.90] Left inguinal hernia     12/28/2023  1:35 AM Elmer Moya S Add [K57.90] Diverticulosis           ED Disposition       ED Disposition   Discharge    Condition   Stable    Date/Time   Thu Dec 28, 2023 0135    Comment   Reese Nicole discharge to home/self care.                   Follow-up Information       Follow up With Specialties Details Why Contact Info Additional  Information    G Larry Cannon, DO Family Medicine Call today return sooner if increased pain, fever, vomiting, worsening diarrhea, bleeding, difficulty urinating,  rash. [address]  [address]  [facility] [place] [facility] [address] [phone]       [facility] Call in 1 day  [address]  [address]  [place] [place] [facility] [address] [phone] [facility] [place], [address], [place], [place], [address] [phone]            Patient's Medications   Discharge Prescriptions    No medications on file       No discharge procedures on file.    PDMP Review         Value Time User    PDMP Reviewed  Yes 12/27/2023 11:36 PM Elmer Moya MD            ED Provider  Electronically Signed by             Elmer Moya MD  12/28/23 0136

## 2023-12-28 NOTE — TELEPHONE ENCOUNTER
Pt called and stated he went to the ER yesterday at [facility],,  He stated that it was due to him being constipated from the pain meds he was taking for knee replacement,,     He stated that he did take meds for constipation and then experienced diarrhea,, he stated that then he had gotten constipation again , he stated that he could not move his bowels at all so he went to the ER, he stated that they did blood work and a CTScan and found no blockage,,  He stated that he is still experiencing abdominal discomfort ( not pain )   He stated that when he left the ED he was told to follow up with dr cosby and to call a gastroenterologist,    He's states that he is going to call the gastro , he stated he would like to know what you recommend he do ..

## 2023-12-29 NOTE — TELEPHONE ENCOUNTER
Called pt and informed of PCP message -- pt was agreeable. Pt has questions about knee pain s/p knee operation -- is following w ortho and will be trying 2 ES tylenol in AM and 2 ES tylenol in PM as he had severe constipation while on oxycodone-acetaminophen leading to the ED visit. Will be f/u on Jan 16th OV.

## 2024-01-02 ENCOUNTER — OFFICE VISIT (OUTPATIENT)
Dept: FAMILY MEDICINE CLINIC | Facility: CLINIC | Age: 74
End: 2024-01-02
Payer: MEDICARE

## 2024-01-02 ENCOUNTER — TELEPHONE (OUTPATIENT)
Dept: OTHER | Facility: HOSPITAL | Age: 74
End: 2024-01-02

## 2024-01-02 VITALS
SYSTOLIC BLOOD PRESSURE: 118 MMHG | HEIGHT: 69 IN | WEIGHT: 197 LBS | TEMPERATURE: 99 F | HEART RATE: 78 BPM | DIASTOLIC BLOOD PRESSURE: 68 MMHG | BODY MASS INDEX: 29.18 KG/M2 | OXYGEN SATURATION: 97 %

## 2024-01-02 DIAGNOSIS — M54.32 SCIATICA OF LEFT SIDE: ICD-10-CM

## 2024-01-02 DIAGNOSIS — F51.01 PRIMARY INSOMNIA: ICD-10-CM

## 2024-01-02 DIAGNOSIS — Z09 HOSPITAL DISCHARGE FOLLOW-UP: Primary | ICD-10-CM

## 2024-01-02 DIAGNOSIS — Z79.899 ENCOUNTER FOR LONG-TERM (CURRENT) USE OF MEDICATIONS: ICD-10-CM

## 2024-01-02 DIAGNOSIS — M47.812 OSTEOARTHRITIS OF CERVICAL SPINE, UNSPECIFIED SPINAL OSTEOARTHRITIS COMPLICATION STATUS: ICD-10-CM

## 2024-01-02 DIAGNOSIS — T40.695A ADVERSE EFFECT OF OTHER NARCOTICS, INITIAL ENCOUNTER: ICD-10-CM

## 2024-01-02 DIAGNOSIS — Z71.2 ENCOUNTER TO DISCUSS TEST RESULTS: ICD-10-CM

## 2024-01-02 DIAGNOSIS — F32.9 REACTIVE DEPRESSION: ICD-10-CM

## 2024-01-02 DIAGNOSIS — Z79.899 MEDICATION MANAGEMENT: ICD-10-CM

## 2024-01-02 PROCEDURE — 99214 OFFICE O/P EST MOD 30 MIN: CPT | Performed by: FAMILY MEDICINE

## 2024-01-02 RX ORDER — BUPROPION HYDROCHLORIDE 300 MG/1
300 TABLET ORAL DAILY
Qty: 30 TABLET | Refills: 3 | Status: SHIPPED | OUTPATIENT
Start: 2024-01-02

## 2024-01-02 RX ORDER — CELECOXIB 200 MG/1
200 CAPSULE ORAL 2 TIMES DAILY
Qty: 90 CAPSULE | Refills: 1 | Status: SHIPPED | OUTPATIENT
Start: 2024-01-02

## 2024-01-02 RX ORDER — OXYCODONE HYDROCHLORIDE 5 MG/1
5 TABLET ORAL EVERY 6 HOURS PRN
Qty: 30 TABLET | Refills: 0 | Status: SHIPPED | OUTPATIENT
Start: 2024-01-02

## 2024-01-02 RX ORDER — ASPIRIN 81 MG/1
81 TABLET ORAL 2 TIMES DAILY
COMMUNITY
Start: 2023-11-28 | End: 2024-01-09

## 2024-01-02 RX ORDER — ZOLPIDEM TARTRATE 5 MG/1
5 TABLET ORAL
Qty: 30 TABLET | Refills: 0 | Status: SHIPPED | OUTPATIENT
Start: 2024-01-02

## 2024-01-02 RX ORDER — GUAIFENESIN AND PSEUDOEPHEDRINE HCL 1200; 120 MG/1; MG/1
1 TABLET, EXTENDED RELEASE ORAL DAILY
COMMUNITY

## 2024-01-02 RX ORDER — OXYCODONE HYDROCHLORIDE 5 MG/1
5 TABLET ORAL EVERY 6 HOURS PRN
COMMUNITY
Start: 2023-12-15 | End: 2024-01-02 | Stop reason: SDUPTHER
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
## 2024-01-02 NOTE — TELEPHONE ENCOUNTER
Pt called to ask for his bupropion prescription. Based on ov with Dr. Cannon today, he was to supposed to receive this medication in addition to the oxycodone and zolpidem (these 2 meds were filled).     Please contact the pt with regards to the bupropion (dosage increase to 300mg)

## 2024-01-03 ENCOUNTER — PATIENT MESSAGE (OUTPATIENT)
Dept: FAMILY MEDICINE CLINIC | Facility: CLINIC | Age: 74
End: 2024-01-03

## 2024-01-03 DIAGNOSIS — J01.41 ACUTE RECURRENT PANSINUSITIS: Primary | ICD-10-CM

## 2024-01-03 NOTE — ASSESSMENT & PLAN NOTE
Discussed use of narcotics and the potential for addiction as well as constipation and other problems. Patient is aware.

## 2024-01-10 RX ORDER — AMOXICILLIN 875 MG/1
875 TABLET, COATED ORAL 2 TIMES DAILY
Qty: 20 TABLET | Refills: 0 | Status: SHIPPED | OUTPATIENT
Start: 2024-01-10 | End: 2024-01-20

## 2024-01-11 DIAGNOSIS — J01.41 ACUTE RECURRENT PANSINUSITIS: ICD-10-CM

## 2024-01-11 RX ORDER — AMOXICILLIN 875 MG/1
875 TABLET, COATED ORAL 2 TIMES DAILY
Qty: 20 TABLET | Refills: 0 | Status: CANCELLED | OUTPATIENT
Start: 2024-01-11 | End: 2024-01-21

## 2024-01-11 NOTE — TELEPHONE ENCOUNTER
Reason for call: NOT A DUPLICATE! PHARMACY NEVER RECIEVED  [x] Refill   [] Prior Auth  [] Other:     Office:   [x] PCP/Provider - [facility]  [] Specialty/Provider -     Medication: amoxicillin (AMOXIL) 875 mg tablet     Quantity: 20    Pharmacy: [id number] - [place], [place] - [address]     Does the patient have enough for 3 days?   [] Yes   [x] No - Send as HP to POD

## 2024-01-11 NOTE — TELEPHONE ENCOUNTER
[facility] called to clarify the abx prescribed as they did not get it.      Verbal given for amoxicillin 875mg BID, quantity 20.     No further action needed.

## 2024-01-16 ENCOUNTER — OFFICE VISIT (OUTPATIENT)
Dept: FAMILY MEDICINE CLINIC | Facility: CLINIC | Age: 74
End: 2024-01-16
Payer: MEDICARE

## 2024-01-16 VITALS
DIASTOLIC BLOOD PRESSURE: 82 MMHG | TEMPERATURE: 98.6 F | HEIGHT: 69 IN | HEART RATE: 88 BPM | BODY MASS INDEX: 29 KG/M2 | SYSTOLIC BLOOD PRESSURE: 130 MMHG | OXYGEN SATURATION: 95 % | WEIGHT: 195.8 LBS

## 2024-01-16 DIAGNOSIS — K40.90 LEFT INGUINAL HERNIA: ICD-10-CM

## 2024-01-16 DIAGNOSIS — M54.32 SCIATICA OF LEFT SIDE: Primary | ICD-10-CM

## 2024-01-16 DIAGNOSIS — Z79.899 MEDICATION MANAGEMENT: ICD-10-CM

## 2024-01-16 DIAGNOSIS — R69 MULTIPLE MEDICAL PROBLEMS: ICD-10-CM

## 2024-01-16 DIAGNOSIS — Z79.899 ENCOUNTER FOR LONG-TERM (CURRENT) USE OF MEDICATIONS: ICD-10-CM

## 2024-01-16 PROCEDURE — 99214 OFFICE O/P EST MOD 30 MIN: CPT | Performed by: FAMILY MEDICINE

## 2024-01-16 RX ORDER — METHYLPREDNISOLONE 4 MG/1
TABLET ORAL
Qty: 21 EACH | Refills: 0 | Status: SHIPPED | OUTPATIENT
Start: 2024-01-16

## 2024-01-16 NOTE — ASSESSMENT & PLAN NOTE
The patient will contact Dr. Bloch to arrange a schedule for his hernia surgery. I have provided the patient with contact information for [facility] and other similar agencies that offer visiting nurse services.

## 2024-01-16 NOTE — PROGRESS NOTES
Assessment/Plan:          1. Sciatica of left side  Assessment & Plan:  Possible inflammation of the sacroiliac joint or a combination of other factors could be causing his symptoms. The patient was advised to continue moving his left knee despite any discomfort. Treatment with methylprednisolone was initiated, with a dosage schedule as follows: 6 tablets today, 5 tablets tomorrow, followed by 4, 3, 2, and 1 tablet on the subsequent days. If symptoms persist, x-rays will be considered. The patient was instructed to take 2 capsules of omeprazole 20 mg, 30 minutes before breakfast while on the methylprednisolone treatment. Additionally, the patient was recommended to chew 1 Pepcid Complete 10 mg tablet at bedtime and to brush his teeth afterwards. The patient was also advised to consume a glass of ice water. The patient was further advised to continue his Celebrex regimen.    Orders:  -     methylPREDNISolone 4 MG tablet therapy pack; Use as directed on package    2. Left inguinal hernia  Assessment & Plan:  The patient will contact Dr. Bloch to arrange a schedule for his hernia surgery. I have provided the patient with contact information for [facility] and other similar agencies that offer visiting nurse services.      3. Multiple medical problems    4. Medication management    5. Encounter for long-term (current) use of medications        Sleep issues.  I have advised him to take zolpidem 5 mg to be taken daily at bedtime as needed. If the patient finds that this dosage is not effective, he may try zolpidem 10 mg. We also discussed the possibility of using Sonata. I have encouraged him to incorporate regular exercise into his routine.    The patient will follow up in 3 weeks.                  Subjective:       Patient ID: Reese Nicole is a 73 y.o. male who presents for evaluation of sciatica and left inguinal hernia.    The patient has poor dietary habits and does not eat enough.    He struggles with  significant sleep disturbances, rarely getting more than 3 hours of sleep. He occasionally naps during the day while watching TV. He wakes up an hour after going to sleep at night and has difficulty falling asleep again. To alleviate this, he watches TV briefly before trying to sleep again. He often wakes up within 45 minutes to 1.5 hours after falling asleep. His total sleep duration per day is approximately 3 hours. Despite taking zolpidem 5 mg daily at bedtime as needed, there has been no improvement. He still has a full bottle of zolpidem.    He experiences significant pain in the knee, which disturbs his sleep. This pain exacerbates when pressure is applied, causing discomfort, and necessitating movement. He underwent knee surgery on 11/28/2023.    He suffers from severe back pain that radiates into the gluteal region and occasionally extends down the leg. Following knee surgery, the patient began experiencing a sharp, intense pain in the foot. Physical therapy was discontinued due to an exacerbation of sciatic symptoms following each session. The patient has been taking Celebrex twice daily and uses a heating pad for temporary relief. He is inquiring whether he should discontinue Celebrex for a while.    He was asymptomatic for his hernia until after surgery. The hernia was not discovered until a CT scan was performed. The right hernia was reported as unproblematic, but a left hernia was identified. The patient chose to delay treatment for the hernia, prioritizing knee surgery instead. However, upon returning home, the hernia began causing discomfort, characterized by a significant ache, though not severe pain. He had a hernia 30 years ago and reports experiencing severe pain at that time. He is inquiring about the possibility of arranging visiting nurse service for a few days since he is alone at home.    He takes 150 mg of bupropion, 2 tablets daily, and 20 mg of omeprazole once a day in the morning, 30  "minutes before breakfast. He previously used Vioxx, which he reported as effective.    2 weeks prior, due to constipation, the patient underwent urinalysis, a complete blood count, and an x-ray. The condition of constipation has since improved.    His PSA levels were normal.    The following portions of the patient's history were reviewed and updated as appropriate: allergies, current medications, past family history, past medical history, past social history, past surgical history, and problem list.          Objective:       /82 (BP Location: Left arm, Patient Position: Sitting, Cuff Size: Standard)   Pulse 88   Temp 98.6 °F (37 °C) (Temporal)   Ht 5' 9\" (1.753 m)   Wt 88.8 kg (195 lb 12.8 oz)   SpO2 95%   BMI 28.91 kg/m²          Physical Exam  Vitals and nursing note reviewed.  Constitutional:       General: He is not in acute distress.     Appearance: Normal appearance. He is well-developed.   HENT:      Head: Normocephalic and atraumatic.   Eyes:      General:         Right eye: No discharge.         Left eye: No discharge.   Neck:      Thyroid: No thyromegaly.   Cardiovascular:      Rate and Rhythm: Normal rate and regular rhythm.      Pulses: Normal pulses.      Heart sounds: Normal heart sounds. No murmur heard.  Pulmonary:      Effort: Pulmonary effort is normal.      Breath sounds: Normal breath sounds. No wheezing or rhonchi.   Musculoskeletal:      Cervical back: Neck supple.      Right lower leg: No edema.      Left lower leg: No edema.   Lymphadenopathy:      Cervical: No cervical adenopathy.   Skin:     General: Skin is warm.      Capillary Refill: Capillary refill takes less than 2 seconds.   Neurological:      General: No focal deficit present.      Mental Status: He is alert and oriented to person, place, and time.   Psychiatric:         Mood and Affect: Mood normal.         Behavior: Behavior normal.         Thought Content: Thought content normal.          I personally reviewed the " recent (and prior)  lab results, the image studies, pathology, other specialty/physicians consult notes and recommendations, and outside medical records from other institutions, as appropriate. I had a lengthy discussion with the patient and shared the work-up findings. We discussed the diagnosis and management plan.  I spent  30  minutes reviewing the records (labs, clinician notes, outside records, medical history, ordering medicine/tests/procedures, interpreting the imaging/labs previously done) and coordination of care as well as direct time with the patient today, of which greater than 50% of the time was spent in counseling and coordination of care with the patient/family.    Transcribed for JESSICA Cannon DO, by Mayra Phipps on 01/16/24 at 11:02 PM. Powered by Dragon [employer].

## 2024-01-16 NOTE — ASSESSMENT & PLAN NOTE
Possible inflammation of the sacroiliac joint or a combination of other factors could be causing his symptoms. The patient was advised to continue moving his left knee despite any discomfort. Treatment with methylprednisolone was initiated, with a dosage schedule as follows: 6 tablets today, 5 tablets tomorrow, followed by 4, 3, 2, and 1 tablet on the subsequent days. If symptoms persist, x-rays will be considered. The patient was instructed to take 2 capsules of omeprazole 20 mg, 30 minutes before breakfast while on the methylprednisolone treatment. Additionally, the patient was recommended to chew 1 Pepcid Complete 10 mg tablet at bedtime and to brush his teeth afterwards. The patient was also advised to consume a glass of ice water. The patient was further advised to continue his Celebrex regimen.

## 2024-02-01 ENCOUNTER — NURSE TRIAGE (OUTPATIENT)
Age: 74
End: 2024-02-01

## 2024-02-01 ENCOUNTER — OFFICE VISIT (OUTPATIENT)
Dept: SURGERY | Facility: CLINIC | Age: 74
End: 2024-02-01
Payer: MEDICARE

## 2024-02-01 VITALS
HEART RATE: 89 BPM | SYSTOLIC BLOOD PRESSURE: 140 MMHG | TEMPERATURE: 98.6 F | BODY MASS INDEX: 29.18 KG/M2 | HEIGHT: 69 IN | DIASTOLIC BLOOD PRESSURE: 78 MMHG | OXYGEN SATURATION: 96 % | WEIGHT: 197 LBS

## 2024-02-01 DIAGNOSIS — K40.90 LEFT INGUINAL HERNIA: Primary | ICD-10-CM

## 2024-02-01 PROCEDURE — 99214 OFFICE O/P EST MOD 30 MIN: CPT | Performed by: SURGERY

## 2024-02-01 NOTE — PROGRESS NOTES
Patient ID: Reese Nicole is a 73 y.o. male Date of Birth 1950       CC:  Left Inguinal Hernia      Allergies:  Patient has no known allergies.    Diagnosis:  Left inguinal hernia      Subjective:   The patient is a 73-year-old male who now has a painful left inguinal hernia.  I had actually seen him for right-sided pain last year and I could not feel a recurrent right inguinal hernia.  I sent him for CAT scan which also did not show a recurrence.  He however had a left-sided defect.  He decided to hold off on repair because he wanted to get his knee done and he was not having pain.  His knee was done in November and now is having pain so he wishes to have this repaired        The following portions of the patient's history were reviewed and updated as appropriate:   Patient Active Problem List   Diagnosis    Recurrent right inguinal hernia    Hospital discharge follow-up    Reactive depression    Osteoarthritis of cervical spine    Adverse effect of other narcotics, initial encounter    Encounter for long-term (current) use of medications    Medication management    Encounter to discuss test results    Primary insomnia    Sciatica of left side    Left inguinal hernia    Multiple medical problems     Past Medical History:   Diagnosis Date    Anxiety     Arthritis 01/01/2010    Depression     GERD (gastroesophageal reflux disease)     Visual impairment     glasses     Past Surgical History:   Procedure Laterality Date    APPENDECTOMY      HERNIA REPAIR      JOINT REPLACEMENT  06/24/2022    full right knee    KNEE ARTHROSCOPY Bilateral     REPLACEMENT TOTAL KNEE Right     WRIST SURGERY Left 2021    L. Wrist--removed small bone and replaced w. tendon due to arthritis     Social History     Socioeconomic History    Marital status: Single     Spouse name: None    Number of children: None    Years of education: None    Highest education level: None   Occupational History    None   Tobacco Use    Smoking status:  Never    Smokeless tobacco: Never   Vaping Use    Vaping status: Never Used   Substance and Sexual Activity    Alcohol use: Yes     Alcohol/week: 2.0 standard drinks of alcohol     Types: 2 Glasses of wine per week     Comment: 1 glass wine/3 nights    Drug use: Never    Sexual activity: Not Currently   Other Topics Concern    None   Social History Narrative    None     Social Determinants of Health     Financial Resource Strain: Low Risk  (11/28/2023)    Received from [facility]    Overall Financial Resource Strain (CARDIA)     Difficulty of Paying Living Expenses: Not very hard   Food Insecurity: No Food Insecurity (11/28/2023)    Received from [facility]    Hunger Vital Sign     Worried About Running Out of Food in the Last Year: Never true     Ran Out of Food in the Last Year: Never true   Transportation Needs: No Transportation Needs (11/28/2023)    Received from [facility]    PRAPARE - Transportation     Lack of Transportation (Medical): No     Lack of Transportation (Non-Medical): No   Physical Activity: Not on file   Stress: Not on file   Social Connections: Not on file   Intimate Partner Violence: Not At Risk (11/28/2023)    Received from [facility]    Humiliation, Afraid, Rape, and Kick questionnaire     Fear of Current or Ex-Partner: No     Emotionally Abused: No     Physically Abused: No     Sexually Abused: No   Housing Stability: Low Risk  (11/28/2023)    Received from [facility]    Housing Stability Vital Sign     Unable to Pay for Housing in the Last Year: No     Number of Places Lived in the Last Year: 1     Unstable Housing in the Last Year: No        Current Outpatient Medications:     ALPRAZolam (XANAX) 0.5 mg tablet, Take 1 tablet (0.5 mg total) by mouth 2 (two) times a day Pt states taking OD PRN, Disp: 60 tablet, Rfl: 3    aspirin (ECOTRIN LOW STRENGTH) 81 mg EC tablet, Take 81 mg by mouth 2 (two) times a  day, Disp: , Rfl:     buPROPion (WELLBUTRIN XL) 300 mg 24 hr tablet, Take 1 tablet (300 mg total) by mouth in the morning, Disp: 30 tablet, Rfl: 3    celecoxib (CeleBREX) 200 mg capsule, Take 1 capsule (200 mg total) by mouth 2 (two) times a day, Disp: 90 capsule, Rfl: 1    ketoconazole (NIZORAL) 2 % cream, Apply topically daily, Disp: 60 g, Rfl: 1    methylPREDNISolone 4 MG tablet therapy pack, Use as directed on package, Disp: 21 each, Rfl: 0    omeprazole (PriLOSEC) 20 mg delayed release capsule, Take 20 mg by mouth in the morning, Disp: , Rfl:     oxyCODONE (ROXICODONE) 5 immediate release tablet, Take 1 tablet (5 mg total) by mouth every 6 (six) hours as needed for severe pain For recent L sided TKR  Max Daily Amount: 20 mg, Disp: 30 tablet, Rfl: 0    oxymetazoline (AFRIN) 0.05 % nasal spray, 2 sprays into each nostril in the morning, Disp: , Rfl:     Pseudoephedrine-guaiFENesin -1200 MG TB12, Take 1 tablet by mouth in the morning, Disp: , Rfl:     zolpidem (AMBIEN) 5 mg tablet, Take 1 tablet (5 mg total) by mouth daily at bedtime as needed for sleep, Disp: 30 tablet, Rfl: 0  Family History   Problem Relation Age of Onset    Lung disease Mother          at 89    Cancer Mother         lung cancer    Crohn's disease Father     Diabetes Sister     Heart failure Sister       Review of Systems   Constitutional:         2 Covid shots  No covid  Wt stable   HENT: Negative.     Eyes:         Wears glasses   Respiratory:          Never smoked   Cardiovascular: Negative.    Gastrointestinal:         GERD  Colonoscopy 4 years ago, 1 polyp   Endocrine: Negative.    Genitourinary:         Nocturia x 1-2   Musculoskeletal:  Positive for arthralgias, back pain and neck pain.   Skin: Negative.    Neurological: Negative.    Hematological:  Does not bruise/bleed easily.   Psychiatric/Behavioral:          Mild depression         Objective:  /78 (BP Location: Left arm, Patient Position: Sitting, Cuff Size:  "Standard)   Pulse 89   Temp 98.6 °F (37 °C) (Tympanic)   Ht 5' 9\" (1.753 m)   Wt 89.4 kg (197 lb)   SpO2 96%   BMI 29.09 kg/m²     Physical Exam  Vitals reviewed.   HENT:      Head: Normocephalic and atraumatic.   Eyes:      General: No scleral icterus.     Conjunctiva/sclera: Conjunctivae normal.   Cardiovascular:      Rate and Rhythm: Normal rate and regular rhythm.      Heart sounds: Normal heart sounds. No murmur heard.  Pulmonary:      Effort: Pulmonary effort is normal. No respiratory distress.      Breath sounds: Normal breath sounds. No stridor. No wheezing, rhonchi or rales.   Abdominal:      General: There is no distension.      Palpations: Abdomen is soft. There is no mass.      Tenderness: There is no abdominal tenderness. There is no guarding.      Hernia: A hernia is present.      Comments: Slightly tender left inguinal hernia.  No right-sided defect   Genitourinary:     Testes: Normal.   Musculoskeletal:      Cervical back: Normal range of motion.      Comments: Is walking a little slowly secondary to the knee replacement   Lymphadenopathy:      Cervical: No cervical adenopathy.   Skin:     General: Skin is dry.      Coloration: Skin is not jaundiced.   Neurological:      Mental Status: He is alert and oriented to person, place, and time.   Psychiatric:         Mood and Affect: Mood normal.         Behavior: Behavior normal.         Thought Content: Thought content normal.         Judgment: Judgment normal.                                    Procedures               Robert Bloch, MD      Portions of the record may have been created with voice recognition software. Occasional wrong word or \"sound a like\" substitutions may have occurred due to the inherent limitations of voice recognition software. Read the chart carefully and recognize, using context, where substitutions have occurred.    "

## 2024-02-01 NOTE — LETTER
February 1, 2024     JESSICA Cannon DO  [address]  [address]  [facility] [address]    Patient: Reese Nicole   YOB: 1950   Date of Visit: 2/1/2024       Dear Dr. Cannon:    Thank you for referring Reese Nicole to me for evaluation. Below are my notes for this consultation.    If you have questions, please do not hesitate to call me. I look forward to following your patient along with you.         Sincerely,        Robert Bloch, MD        CC: No Recipients    Robert Bloch, MD  2/1/2024 10:36 AM  Sign when Signing Visit  Patient ID: Reese Nicole is a 73 y.o. male Date of Birth 1950       CC:  Left Inguinal Hernia      Allergies:  Patient has no known allergies.    Diagnosis:  Left inguinal hernia      Subjective:   The patient is a 73-year-old male who now has a painful left inguinal hernia.  I had actually seen him for right-sided pain last year and I could not feel a recurrent right inguinal hernia.  I sent him for CAT scan which also did not show a recurrence.  He however had a left-sided defect.  He decided to hold off on repair because he wanted to get his knee done and he was not having pain.  His knee was done in November and now is having pain so he wishes to have this repaired        The following portions of the patient's history were reviewed and updated as appropriate:   Patient Active Problem List   Diagnosis   • Recurrent right inguinal hernia   • Hospital discharge follow-up   • Reactive depression   • Osteoarthritis of cervical spine   • Adverse effect of other narcotics, initial encounter   • Encounter for long-term (current) use of medications   • Medication management   • Encounter to discuss test results   • Primary insomnia   • Sciatica of left side   • Left inguinal hernia   • Multiple medical problems     Past Medical History:   Diagnosis Date   • Anxiety    • Arthritis 01/01/2010   • Depression    • GERD (gastroesophageal reflux disease)    • Visual  impairment     glasses     Past Surgical History:   Procedure Laterality Date   • APPENDECTOMY     • HERNIA REPAIR     • JOINT REPLACEMENT  06/24/2022    full right knee   • KNEE ARTHROSCOPY Bilateral    • REPLACEMENT TOTAL KNEE Right    • WRIST SURGERY Left 2021    L. Wrist--removed small bone and replaced w. tendon due to arthritis     Social History     Socioeconomic History   • Marital status: Single     Spouse name: None   • Number of children: None   • Years of education: None   • Highest education level: None   Occupational History   • None   Tobacco Use   • Smoking status: Never   • Smokeless tobacco: Never   Vaping Use   • Vaping status: Never Used   Substance and Sexual Activity   • Alcohol use: Yes     Alcohol/week: 2.0 standard drinks of alcohol     Types: 2 Glasses of wine per week     Comment: 1 glass wine/3 nights   • Drug use: Never   • Sexual activity: Not Currently   Other Topics Concern   • None   Social History Narrative   • None     Social Determinants of Health     Financial Resource Strain: Low Risk  (11/28/2023)    Received from [facility]    Overall Financial Resource Strain (CARDIA)    • Difficulty of Paying Living Expenses: Not very hard   Food Insecurity: No Food Insecurity (11/28/2023)    Received from [facility]    Hunger Vital Sign    • Worried About Running Out of Food in the Last Year: Never true    • Ran Out of Food in the Last Year: Never true   Transportation Needs: No Transportation Needs (11/28/2023)    Received from [facility]    PRAPARE - Transportation    • Lack of Transportation (Medical): No    • Lack of Transportation (Non-Medical): No   Physical Activity: Not on file   Stress: Not on file   Social Connections: Not on file   Intimate Partner Violence: Not At Risk (11/28/2023)    Received from [facility]    Humiliation, Afraid, Rape, and Kick questionnaire    • Fear of Current or Ex-Partner: No    •  Emotionally Abused: No    • Physically Abused: No    • Sexually Abused: No   Housing Stability: Low Risk  (2023)    Received from [facility]    Housing Stability Vital Sign    • Unable to Pay for Housing in the Last Year: No    • Number of Places Lived in the Last Year: 1    • Unstable Housing in the Last Year: No        Current Outpatient Medications:   •  ALPRAZolam (XANAX) 0.5 mg tablet, Take 1 tablet (0.5 mg total) by mouth 2 (two) times a day Pt states taking OD PRN, Disp: 60 tablet, Rfl: 3  •  aspirin (ECOTRIN LOW STRENGTH) 81 mg EC tablet, Take 81 mg by mouth 2 (two) times a day, Disp: , Rfl:   •  buPROPion (WELLBUTRIN XL) 300 mg 24 hr tablet, Take 1 tablet (300 mg total) by mouth in the morning, Disp: 30 tablet, Rfl: 3  •  celecoxib (CeleBREX) 200 mg capsule, Take 1 capsule (200 mg total) by mouth 2 (two) times a day, Disp: 90 capsule, Rfl: 1  •  ketoconazole (NIZORAL) 2 % cream, Apply topically daily, Disp: 60 g, Rfl: 1  •  methylPREDNISolone 4 MG tablet therapy pack, Use as directed on package, Disp: 21 each, Rfl: 0  •  omeprazole (PriLOSEC) 20 mg delayed release capsule, Take 20 mg by mouth in the morning, Disp: , Rfl:   •  oxyCODONE (ROXICODONE) 5 immediate release tablet, Take 1 tablet (5 mg total) by mouth every 6 (six) hours as needed for severe pain For recent L sided TKR  Max Daily Amount: 20 mg, Disp: 30 tablet, Rfl: 0  •  oxymetazoline (AFRIN) 0.05 % nasal spray, 2 sprays into each nostril in the morning, Disp: , Rfl:   •  Pseudoephedrine-guaiFENesin -1200 MG TB12, Take 1 tablet by mouth in the morning, Disp: , Rfl:   •  zolpidem (AMBIEN) 5 mg tablet, Take 1 tablet (5 mg total) by mouth daily at bedtime as needed for sleep, Disp: 30 tablet, Rfl: 0  Family History   Problem Relation Age of Onset   • Lung disease Mother          at 89   • Cancer Mother         lung cancer   • Crohn's disease Father    • Diabetes Sister    • Heart failure Sister       Review of  "Systems   Constitutional:         2 Covid shots  No covid  Wt stable   HENT: Negative.     Eyes:         Wears glasses   Respiratory:          Never smoked   Cardiovascular: Negative.    Gastrointestinal:         GERD  Colonoscopy 4 years ago, 1 polyp   Endocrine: Negative.    Genitourinary:         Nocturia x 1-2   Musculoskeletal:  Positive for arthralgias, back pain and neck pain.   Skin: Negative.    Neurological: Negative.    Hematological:  Does not bruise/bleed easily.   Psychiatric/Behavioral:          Mild depression         Objective:  /78 (BP Location: Left arm, Patient Position: Sitting, Cuff Size: Standard)   Pulse 89   Temp 98.6 °F (37 °C) (Tympanic)   Ht 5' 9\" (1.753 m)   Wt 89.4 kg (197 lb)   SpO2 96%   BMI 29.09 kg/m²     Physical Exam  Vitals reviewed.   HENT:      Head: Normocephalic and atraumatic.   Eyes:      General: No scleral icterus.     Conjunctiva/sclera: Conjunctivae normal.   Cardiovascular:      Rate and Rhythm: Normal rate and regular rhythm.      Heart sounds: Normal heart sounds. No murmur heard.  Pulmonary:      Effort: Pulmonary effort is normal. No respiratory distress.      Breath sounds: Normal breath sounds. No stridor. No wheezing, rhonchi or rales.   Abdominal:      General: There is no distension.      Palpations: Abdomen is soft. There is no mass.      Tenderness: There is no abdominal tenderness. There is no guarding.      Hernia: A hernia is present.      Comments: Slightly tender left inguinal hernia.  No right-sided defect   Genitourinary:     Testes: Normal.   Musculoskeletal:      Cervical back: Normal range of motion.      Comments: Is walking a little slowly secondary to the knee replacement   Lymphadenopathy:      Cervical: No cervical adenopathy.   Skin:     General: Skin is dry.      Coloration: Skin is not jaundiced.   Neurological:      Mental Status: He is alert and oriented to person, place, and time.   Psychiatric:         Mood and Affect: Mood " "normal.         Behavior: Behavior normal.         Thought Content: Thought content normal.         Judgment: Judgment normal.                                    Procedures               Robert Bloch, MD      Portions of the record may have been created with voice recognition software. Occasional wrong word or \"sound a like\" substitutions may have occurred due to the inherent limitations of voice recognition software. Read the chart carefully and recognize, using context, where substitutions have occurred.    "

## 2024-02-01 NOTE — TELEPHONE ENCOUNTER
"Patient concerned about where his surgery on 2/7 is taking place.  Forwarded message to Surgical coordinator for [facility].      Reason for Disposition   Information only question and nurse able to answer    Answer Assessment - Initial Assessment Questions  1. REASON FOR CALL or QUESTION: \"What is your reason for calling today?\" or \"How can I best help you?\" or \"What question do you have that I can help answer?\"      Question where surgery is    Protocols used: Information Only Call - No Triage-ADULT-[place]    "

## 2024-02-01 NOTE — LETTER
February 1, 2024     JESSICA Cannon DO  [address]  [address]  [facility] [address]    Patient: Reese Nicole   YOB: 1950   Date of Visit: 2/1/2024       Dear Dr. Cannon:    Thank you for referring Reese Nicole to me for evaluation. Below are my notes for this consultation.    If you have questions, please do not hesitate to call me. I look forward to following your patient along with you.         Sincerely,        Robert Bloch, MD        CC: No Recipients    Robert Bloch, MD  2/1/2024 10:33 AM  Incomplete  Patient ID: Reese Nicole is a 73 y.o. male Date of Birth 1950       CC:  Left Inguinal Hernia      Allergies:  Patient has no known allergies.    Diagnosis:  Left inguinal hernia      Subjective:   The patient is a 73-year-old male who now has a painful left inguinal hernia.  I had actually seen him for right-sided pain last year and I could not feel a recurrent right inguinal hernia.  I sent him for CAT scan which also did not show a recurrence.  He however had a left-sided defect.  He decided to hold off on repair because he wanted to get his knee done and he was not having pain.  His knee was done in November and now is having pain so he wishes to have this repaired        The following portions of the patient's history were reviewed and updated as appropriate:   Patient Active Problem List   Diagnosis   • Recurrent right inguinal hernia   • Hospital discharge follow-up   • Reactive depression   • Osteoarthritis of cervical spine   • Adverse effect of other narcotics, initial encounter   • Encounter for long-term (current) use of medications   • Medication management   • Encounter to discuss test results   • Primary insomnia   • Sciatica of left side   • Left inguinal hernia   • Multiple medical problems     Past Medical History:   Diagnosis Date   • Anxiety    • Arthritis 01/01/2010   • Depression    • GERD (gastroesophageal reflux disease)    • Visual impairment      glasses     Past Surgical History:   Procedure Laterality Date   • APPENDECTOMY     • HERNIA REPAIR     • JOINT REPLACEMENT  06/24/2022    full right knee   • KNEE ARTHROSCOPY Bilateral    • REPLACEMENT TOTAL KNEE Right    • WRIST SURGERY Left 2021    L. Wrist--removed small bone and replaced w. tendon due to arthritis     Social History     Socioeconomic History   • Marital status: Single     Spouse name: None   • Number of children: None   • Years of education: None   • Highest education level: None   Occupational History   • None   Tobacco Use   • Smoking status: Never   • Smokeless tobacco: Never   Vaping Use   • Vaping status: Never Used   Substance and Sexual Activity   • Alcohol use: Yes     Alcohol/week: 2.0 standard drinks of alcohol     Types: 2 Glasses of wine per week     Comment: 1 glass wine/3 nights   • Drug use: Never   • Sexual activity: Not Currently   Other Topics Concern   • None   Social History Narrative   • None     Social Determinants of Health     Financial Resource Strain: Low Risk  (11/28/2023)    Received from [facility]    Overall Financial Resource Strain (CARDIA)    • Difficulty of Paying Living Expenses: Not very hard   Food Insecurity: No Food Insecurity (11/28/2023)    Received from [facility]    Hunger Vital Sign    • Worried About Running Out of Food in the Last Year: Never true    • Ran Out of Food in the Last Year: Never true   Transportation Needs: No Transportation Needs (11/28/2023)    Received from [facility]    PRAPARE - Transportation    • Lack of Transportation (Medical): No    • Lack of Transportation (Non-Medical): No   Physical Activity: Not on file   Stress: Not on file   Social Connections: Not on file   Intimate Partner Violence: Not At Risk (11/28/2023)    Received from [facility]    Humiliation, Afraid, Rape, and Kick questionnaire    • Fear of Current or Ex-Partner: No    • Emotionally  Abused: No    • Physically Abused: No    • Sexually Abused: No   Housing Stability: Low Risk  (2023)    Received from [facility]    Housing Stability Vital Sign    • Unable to Pay for Housing in the Last Year: No    • Number of Places Lived in the Last Year: 1    • Unstable Housing in the Last Year: No        Current Outpatient Medications:   •  ALPRAZolam (XANAX) 0.5 mg tablet, Take 1 tablet (0.5 mg total) by mouth 2 (two) times a day Pt states taking OD PRN, Disp: 60 tablet, Rfl: 3  •  aspirin (ECOTRIN LOW STRENGTH) 81 mg EC tablet, Take 81 mg by mouth 2 (two) times a day, Disp: , Rfl:   •  buPROPion (WELLBUTRIN XL) 300 mg 24 hr tablet, Take 1 tablet (300 mg total) by mouth in the morning, Disp: 30 tablet, Rfl: 3  •  celecoxib (CeleBREX) 200 mg capsule, Take 1 capsule (200 mg total) by mouth 2 (two) times a day, Disp: 90 capsule, Rfl: 1  •  ketoconazole (NIZORAL) 2 % cream, Apply topically daily, Disp: 60 g, Rfl: 1  •  methylPREDNISolone 4 MG tablet therapy pack, Use as directed on package, Disp: 21 each, Rfl: 0  •  omeprazole (PriLOSEC) 20 mg delayed release capsule, Take 20 mg by mouth in the morning, Disp: , Rfl:   •  oxyCODONE (ROXICODONE) 5 immediate release tablet, Take 1 tablet (5 mg total) by mouth every 6 (six) hours as needed for severe pain For recent L sided TKR  Max Daily Amount: 20 mg, Disp: 30 tablet, Rfl: 0  •  oxymetazoline (AFRIN) 0.05 % nasal spray, 2 sprays into each nostril in the morning, Disp: , Rfl:   •  Pseudoephedrine-guaiFENesin -1200 MG TB12, Take 1 tablet by mouth in the morning, Disp: , Rfl:   •  zolpidem (AMBIEN) 5 mg tablet, Take 1 tablet (5 mg total) by mouth daily at bedtime as needed for sleep, Disp: 30 tablet, Rfl: 0  Family History   Problem Relation Age of Onset   • Lung disease Mother          at 89   • Cancer Mother         lung cancer   • Crohn's disease Father    • Diabetes Sister    • Heart failure Sister       Review of Systems  "  Constitutional:         2 Covid shots  No covid  Wt stable   HENT: Negative.     Eyes:         Wears glasses   Respiratory:          Never smoked   Cardiovascular: Negative.    Gastrointestinal:         GERD  Colonoscopy 4 years ago, 1 polyp   Endocrine: Negative.    Genitourinary:         Nocturia x 1-2   Musculoskeletal:  Positive for arthralgias, back pain and neck pain.   Skin: Negative.    Neurological: Negative.    Hematological:  Does not bruise/bleed easily.   Psychiatric/Behavioral:          Mild depression         Objective:  /78 (BP Location: Left arm, Patient Position: Sitting, Cuff Size: Standard)   Pulse 89   Temp 98.6 °F (37 °C) (Tympanic)   Ht 5' 9\" (1.753 m)   Wt 89.4 kg (197 lb)   SpO2 96%   BMI 29.09 kg/m²     Physical Exam  Vitals reviewed.   HENT:      Head: Normocephalic and atraumatic.   Eyes:      General: No scleral icterus.     Conjunctiva/sclera: Conjunctivae normal.   Cardiovascular:      Rate and Rhythm: Normal rate and regular rhythm.      Heart sounds: Normal heart sounds. No murmur heard.  Pulmonary:      Effort: Pulmonary effort is normal. No respiratory distress.      Breath sounds: Normal breath sounds. No stridor. No wheezing, rhonchi or rales.   Abdominal:      General: There is no distension.      Palpations: Abdomen is soft. There is no mass.      Tenderness: There is no abdominal tenderness. There is no guarding.      Hernia: A hernia is present.      Comments: Slightly tender left inguinal hernia.  No right-sided defect   Genitourinary:     Testes: Normal.   Musculoskeletal:      Cervical back: Normal range of motion.      Comments: Is walking a little slowly secondary to the knee replacement   Lymphadenopathy:      Cervical: No cervical adenopathy.   Skin:     General: Skin is dry.      Coloration: Skin is not jaundiced.   Neurological:      Mental Status: He is alert and oriented to person, place, and time.   Psychiatric:         Mood and Affect: Mood normal.  " "       Behavior: Behavior normal.         Thought Content: Thought content normal.         Judgment: Judgment normal.                                    Procedures               Robert Bloch, MD      Portions of the record may have been created with voice recognition software. Occasional wrong word or \"sound a like\" substitutions may have occurred due to the inherent limitations of voice recognition software. Read the chart carefully and recognize, using context, where substitutions have occurred.      Robert Bloch, MD  2/1/2024 10:26 AM  Sign when Signing Visit  Patient ID: Reese Nicole is a 73 y.o. male Date of Birth 1950       CC:  Left Inguinal Hernia      Allergies:  Patient has no known allergies.    Diagnosis:  {There are no diagnoses linked to this encounter. (Refresh or delete this SmartLink)}  No diagnosis found.       Subjective:   HPI    The following portions of the patient's history were reviewed and updated as appropriate:   Patient Active Problem List   Diagnosis   • Recurrent right inguinal hernia   • Hospital discharge follow-up   • Reactive depression   • Osteoarthritis of cervical spine   • Adverse effect of other narcotics, initial encounter   • Encounter for long-term (current) use of medications   • Medication management   • Encounter to discuss test results   • Primary insomnia   • Sciatica of left side   • Left inguinal hernia   • Multiple medical problems     Past Medical History:   Diagnosis Date   • Anxiety    • Arthritis 01/01/2010   • Depression    • GERD (gastroesophageal reflux disease)    • Visual impairment     glasses     Past Surgical History:   Procedure Laterality Date   • APPENDECTOMY     • HERNIA REPAIR     • JOINT REPLACEMENT  06/24/2022    full right knee   • KNEE ARTHROSCOPY Bilateral    • REPLACEMENT TOTAL KNEE Right    • WRIST SURGERY Left 2021    L. Wrist--removed small bone and replaced w. tendon due to arthritis     Social History     Socioeconomic History "   • Marital status: Single     Spouse name: None   • Number of children: None   • Years of education: None   • Highest education level: None   Occupational History   • None   Tobacco Use   • Smoking status: Never   • Smokeless tobacco: Never   Vaping Use   • Vaping status: Never Used   Substance and Sexual Activity   • Alcohol use: Yes     Alcohol/week: 2.0 standard drinks of alcohol     Types: 2 Glasses of wine per week     Comment: 1 glass wine/3 nights   • Drug use: Never   • Sexual activity: Not Currently   Other Topics Concern   • None   Social History Narrative   • None     Social Determinants of Health     Financial Resource Strain: Low Risk  (11/28/2023)    Received from [facility]    Overall Financial Resource Strain (CARDIA)    • Difficulty of Paying Living Expenses: Not very hard   Food Insecurity: No Food Insecurity (11/28/2023)    Received from [facility]    Hunger Vital Sign    • Worried About Running Out of Food in the Last Year: Never true    • Ran Out of Food in the Last Year: Never true   Transportation Needs: No Transportation Needs (11/28/2023)    Received from [facility]    PRAPARE - Transportation    • Lack of Transportation (Medical): No    • Lack of Transportation (Non-Medical): No   Physical Activity: Not on file   Stress: Not on file   Social Connections: Not on file   Intimate Partner Violence: Not At Risk (11/28/2023)    Received from [facility]    Humiliation, Afraid, Rape, and Kick questionnaire    • Fear of Current or Ex-Partner: No    • Emotionally Abused: No    • Physically Abused: No    • Sexually Abused: No   Housing Stability: Low Risk  (11/28/2023)    Received from [facility]    Housing Stability Vital Sign    • Unable to Pay for Housing in the Last Year: No    • Number of Places Lived in the Last Year: 1    • Unstable Housing in the Last Year: No        Current Outpatient Medications:   •   ALPRAZolam (XANAX) 0.5 mg tablet, Take 1 tablet (0.5 mg total) by mouth 2 (two) times a day Pt states taking OD PRN, Disp: 60 tablet, Rfl: 3  •  aspirin (ECOTRIN LOW STRENGTH) 81 mg EC tablet, Take 81 mg by mouth 2 (two) times a day, Disp: , Rfl:   •  buPROPion (WELLBUTRIN XL) 300 mg 24 hr tablet, Take 1 tablet (300 mg total) by mouth in the morning, Disp: 30 tablet, Rfl: 3  •  celecoxib (CeleBREX) 200 mg capsule, Take 1 capsule (200 mg total) by mouth 2 (two) times a day, Disp: 90 capsule, Rfl: 1  •  ketoconazole (NIZORAL) 2 % cream, Apply topically daily, Disp: 60 g, Rfl: 1  •  methylPREDNISolone 4 MG tablet therapy pack, Use as directed on package, Disp: 21 each, Rfl: 0  •  omeprazole (PriLOSEC) 20 mg delayed release capsule, Take 20 mg by mouth in the morning, Disp: , Rfl:   •  oxyCODONE (ROXICODONE) 5 immediate release tablet, Take 1 tablet (5 mg total) by mouth every 6 (six) hours as needed for severe pain For recent L sided TKR  Max Daily Amount: 20 mg, Disp: 30 tablet, Rfl: 0  •  oxymetazoline (AFRIN) 0.05 % nasal spray, 2 sprays into each nostril in the morning, Disp: , Rfl:   •  Pseudoephedrine-guaiFENesin -1200 MG TB12, Take 1 tablet by mouth in the morning, Disp: , Rfl:   •  zolpidem (AMBIEN) 5 mg tablet, Take 1 tablet (5 mg total) by mouth daily at bedtime as needed for sleep, Disp: 30 tablet, Rfl: 0  Family History   Problem Relation Age of Onset   • Lung disease Mother          at 89   • Cancer Mother         lung cancer   • Crohn's disease Father    • Diabetes Sister    • Heart failure Sister       Review of Systems   Constitutional:         2 Covid shots  No covid  Wt stable   HENT: Negative.     Eyes:         Wears glasses   Respiratory:          Never smoked   Cardiovascular: Negative.    Gastrointestinal:         GERD  Colonoscopy 4 years ago, 1 polyp   Endocrine: Negative.    Genitourinary:         Nocturia x 1-2   Musculoskeletal:  Positive for arthralgias, back pain and neck pain.  "  Skin: Negative.    Neurological: Negative.    Hematological:  Does not bruise/bleed easily.   Psychiatric/Behavioral:          Mild depression         Objective:  /78 (BP Location: Left arm, Patient Position: Sitting, Cuff Size: Standard)   Pulse 89   Temp 98.6 °F (37 °C) (Tympanic)   Ht 5' 9\" (1.753 m)   Wt 89.4 kg (197 lb)   SpO2 96%   BMI 29.09 kg/m²     Physical Exam                               Procedures               Robert Bloch, MD      Portions of the record may have been created with voice recognition software. Occasional wrong word or \"sound a like\" substitutions may have occurred due to the inherent limitations of voice recognition software. Read the chart carefully and recognize, using context, where substitutions have occurred.    "

## 2024-02-01 NOTE — PRE-PROCEDURE INSTRUCTIONS
Pre-Surgery Instructions:   Medication Instructions    ALPRAZolam (XANAX) 0.5 mg tablet Uses PRN- OK to take day of surgery    buPROPion (WELLBUTRIN XL) 300 mg 24 hr tablet Take day of surgery.    celecoxib (CeleBREX) 200 mg capsule Stop taking 3 days prior to surgery.    ketoconazole (NIZORAL) 2 % cream Hold day of surgery.    omeprazole (PriLOSEC) 20 mg delayed release capsule Take day of surgery.    oxyCODONE (ROXICODONE) 5 immediate release tablet Uses PRN- OK to take day of surgery    oxymetazoline (AFRIN) 0.05 % nasal spray Uses PRN- OK to take day of surgery    zolpidem (AMBIEN) 5 mg tablet Take night before surgery      Medication instructions for day surgery reviewed. Please use only a sip of water to take your instructed medications. Avoid all over the counter vitamins, supplements and NSAIDS for one week prior to surgery per anesthesia guidelines. Tylenol is ok to take as needed.     You will receive a call one business day prior to surgery with an arrival time and hospital directions. If your surgery is scheduled on a Monday, the hospital will be calling you on the Friday prior to your surgery. If you have not heard from anyone by 8pm, please call the  hospital supervisor through the hospital  at [phone]. ([place] [phone]).    Do not eat or drink anything after midnight the night before your surgery, including candy, mints, lifesavers, or chewing gum. Do not drink alcohol 24hrs before your surgery. Try not to smoke at least 24hrs before your surgery.       Follow the pre surgery showering instructions as listed in the “My Surgical Experience Booklet” or otherwise provided by your surgeon's office. Do not use a blade to shave the surgical area 1 week before surgery. It is okay to use a clean electric clippers up to 24 hours before surgery. Do not apply any lotions, creams, including makeup, cologne, deodorant, or perfumes after showering on the day of your surgery. Do not use dry  shampoo, hair spray, hair gel, or any type of hair products.     No contact lenses, eye make-up, or artificial eyelashes. Remove nail polish, including gel polish, and any artificial, gel, or acrylic nails if possible. Remove all jewelry including rings and body piercing jewelry.     Wear causal clothing that is easy to take on and off. Consider your type of surgery.    Keep any valuables, jewelry, piercings at home. Please bring any specially ordered equipment (sling, braces) if indicated.    Arrange for a responsible person to drive you to and from the hospital on the day of your surgery. Visitor Guidelines discussed.     Call the surgeon's office with any new illnesses, exposures, or additional questions prior to surgery.    Please reference your “My Surgical Experience Booklet” for additional information to prepare for your upcoming surgery.

## 2024-02-01 NOTE — H&P (VIEW-ONLY)
Patient ID: Reese Nicole is a 73 y.o. male Date of Birth 1950       CC:  Left Inguinal Hernia      Allergies:  Patient has no known allergies.    Diagnosis:  Left inguinal hernia      Subjective:   The patient is a 73-year-old male who now has a painful left inguinal hernia.  I had actually seen him for right-sided pain last year and I could not feel a recurrent right inguinal hernia.  I sent him for CAT scan which also did not show a recurrence.  He however had a left-sided defect.  He decided to hold off on repair because he wanted to get his knee done and he was not having pain.  His knee was done in November and now is having pain so he wishes to have this repaired        The following portions of the patient's history were reviewed and updated as appropriate:   Patient Active Problem List   Diagnosis    Recurrent right inguinal hernia    Hospital discharge follow-up    Reactive depression    Osteoarthritis of cervical spine    Adverse effect of other narcotics, initial encounter    Encounter for long-term (current) use of medications    Medication management    Encounter to discuss test results    Primary insomnia    Sciatica of left side    Left inguinal hernia    Multiple medical problems     Past Medical History:   Diagnosis Date    Anxiety     Arthritis 01/01/2010    Depression     GERD (gastroesophageal reflux disease)     Visual impairment     glasses     Past Surgical History:   Procedure Laterality Date    APPENDECTOMY      HERNIA REPAIR      JOINT REPLACEMENT  06/24/2022    full right knee    KNEE ARTHROSCOPY Bilateral     REPLACEMENT TOTAL KNEE Right     WRIST SURGERY Left 2021    L. Wrist--removed small bone and replaced w. tendon due to arthritis     Social History     Socioeconomic History    Marital status: Single     Spouse name: None    Number of children: None    Years of education: None    Highest education level: None   Occupational History    None   Tobacco Use    Smoking status:  Never    Smokeless tobacco: Never   Vaping Use    Vaping status: Never Used   Substance and Sexual Activity    Alcohol use: Yes     Alcohol/week: 2.0 standard drinks of alcohol     Types: 2 Glasses of wine per week     Comment: 1 glass wine/3 nights    Drug use: Never    Sexual activity: Not Currently   Other Topics Concern    None   Social History Narrative    None     Social Determinants of Health     Financial Resource Strain: Low Risk  (11/28/2023)    Received from [facility]    Overall Financial Resource Strain (CARDIA)     Difficulty of Paying Living Expenses: Not very hard   Food Insecurity: No Food Insecurity (11/28/2023)    Received from [facility]    Hunger Vital Sign     Worried About Running Out of Food in the Last Year: Never true     Ran Out of Food in the Last Year: Never true   Transportation Needs: No Transportation Needs (11/28/2023)    Received from [facility]    PRAPARE - Transportation     Lack of Transportation (Medical): No     Lack of Transportation (Non-Medical): No   Physical Activity: Not on file   Stress: Not on file   Social Connections: Not on file   Intimate Partner Violence: Not At Risk (11/28/2023)    Received from [facility]    Humiliation, Afraid, Rape, and Kick questionnaire     Fear of Current or Ex-Partner: No     Emotionally Abused: No     Physically Abused: No     Sexually Abused: No   Housing Stability: Low Risk  (11/28/2023)    Received from [facility]    Housing Stability Vital Sign     Unable to Pay for Housing in the Last Year: No     Number of Places Lived in the Last Year: 1     Unstable Housing in the Last Year: No        Current Outpatient Medications:     ALPRAZolam (XANAX) 0.5 mg tablet, Take 1 tablet (0.5 mg total) by mouth 2 (two) times a day Pt states taking OD PRN, Disp: 60 tablet, Rfl: 3    aspirin (ECOTRIN LOW STRENGTH) 81 mg EC tablet, Take 81 mg by mouth 2 (two) times a  day, Disp: , Rfl:     buPROPion (WELLBUTRIN XL) 300 mg 24 hr tablet, Take 1 tablet (300 mg total) by mouth in the morning, Disp: 30 tablet, Rfl: 3    celecoxib (CeleBREX) 200 mg capsule, Take 1 capsule (200 mg total) by mouth 2 (two) times a day, Disp: 90 capsule, Rfl: 1    ketoconazole (NIZORAL) 2 % cream, Apply topically daily, Disp: 60 g, Rfl: 1    methylPREDNISolone 4 MG tablet therapy pack, Use as directed on package, Disp: 21 each, Rfl: 0    omeprazole (PriLOSEC) 20 mg delayed release capsule, Take 20 mg by mouth in the morning, Disp: , Rfl:     oxyCODONE (ROXICODONE) 5 immediate release tablet, Take 1 tablet (5 mg total) by mouth every 6 (six) hours as needed for severe pain For recent L sided TKR  Max Daily Amount: 20 mg, Disp: 30 tablet, Rfl: 0    oxymetazoline (AFRIN) 0.05 % nasal spray, 2 sprays into each nostril in the morning, Disp: , Rfl:     Pseudoephedrine-guaiFENesin -1200 MG TB12, Take 1 tablet by mouth in the morning, Disp: , Rfl:     zolpidem (AMBIEN) 5 mg tablet, Take 1 tablet (5 mg total) by mouth daily at bedtime as needed for sleep, Disp: 30 tablet, Rfl: 0  Family History   Problem Relation Age of Onset    Lung disease Mother          at 89    Cancer Mother         lung cancer    Crohn's disease Father     Diabetes Sister     Heart failure Sister       Review of Systems   Constitutional:         2 Covid shots  No covid  Wt stable   HENT: Negative.     Eyes:         Wears glasses   Respiratory:          Never smoked   Cardiovascular: Negative.    Gastrointestinal:         GERD  Colonoscopy 4 years ago, 1 polyp   Endocrine: Negative.    Genitourinary:         Nocturia x 1-2   Musculoskeletal:  Positive for arthralgias, back pain and neck pain.   Skin: Negative.    Neurological: Negative.    Hematological:  Does not bruise/bleed easily.   Psychiatric/Behavioral:          Mild depression         Objective:  /78 (BP Location: Left arm, Patient Position: Sitting, Cuff Size:  "Standard)   Pulse 89   Temp 98.6 °F (37 °C) (Tympanic)   Ht 5' 9\" (1.753 m)   Wt 89.4 kg (197 lb)   SpO2 96%   BMI 29.09 kg/m²     Physical Exam  Vitals reviewed.   HENT:      Head: Normocephalic and atraumatic.   Eyes:      General: No scleral icterus.     Conjunctiva/sclera: Conjunctivae normal.   Cardiovascular:      Rate and Rhythm: Normal rate and regular rhythm.      Heart sounds: Normal heart sounds. No murmur heard.  Pulmonary:      Effort: Pulmonary effort is normal. No respiratory distress.      Breath sounds: Normal breath sounds. No stridor. No wheezing, rhonchi or rales.   Abdominal:      General: There is no distension.      Palpations: Abdomen is soft. There is no mass.      Tenderness: There is no abdominal tenderness. There is no guarding.      Hernia: A hernia is present.      Comments: Slightly tender left inguinal hernia.  No right-sided defect   Genitourinary:     Testes: Normal.   Musculoskeletal:      Cervical back: Normal range of motion.      Comments: Is walking a little slowly secondary to the knee replacement   Lymphadenopathy:      Cervical: No cervical adenopathy.   Skin:     General: Skin is dry.      Coloration: Skin is not jaundiced.   Neurological:      Mental Status: He is alert and oriented to person, place, and time.   Psychiatric:         Mood and Affect: Mood normal.         Behavior: Behavior normal.         Thought Content: Thought content normal.         Judgment: Judgment normal.                                    Procedures               Robert Bloch, MD      Portions of the record may have been created with voice recognition software. Occasional wrong word or \"sound a like\" substitutions may have occurred due to the inherent limitations of voice recognition software. Read the chart carefully and recognize, using context, where substitutions have occurred.    "

## 2024-02-06 ENCOUNTER — ANESTHESIA EVENT (OUTPATIENT)
Dept: PERIOP | Facility: HOSPITAL | Age: 74
End: 2024-02-06
Payer: MEDICARE

## 2024-02-06 ENCOUNTER — TELEPHONE (OUTPATIENT)
Dept: SURGERY | Facility: CLINIC | Age: 74
End: 2024-02-06

## 2024-02-06 NOTE — TELEPHONE ENCOUNTER
Spoke with patient, did relay message that it would be ordered post op. He was upset about this as he preferred to  today instead of after surgery tomorrow.

## 2024-02-06 NOTE — TELEPHONE ENCOUNTER
Patient called  was to call prescription in for pain medication and laxative, he is having hernia surgery tomorrow 02/07/24. Please send prescription to   [facility]   [facility] [address]   [phone]    Any question please call patient at [phone]

## 2024-02-07 ENCOUNTER — HOSPITAL ENCOUNTER (OUTPATIENT)
Facility: HOSPITAL | Age: 74
Setting detail: OUTPATIENT SURGERY
Discharge: HOME | End: 2024-02-07
Attending: SURGERY | Admitting: SURGERY
Payer: MEDICARE

## 2024-02-07 ENCOUNTER — ANESTHESIA (OUTPATIENT)
Dept: PERIOP | Facility: HOSPITAL | Age: 74
End: 2024-02-07
Payer: MEDICARE

## 2024-02-07 VITALS
RESPIRATION RATE: 18 BRPM | HEART RATE: 96 BPM | OXYGEN SATURATION: 95 % | TEMPERATURE: 97.8 F | HEIGHT: 69 IN | BODY MASS INDEX: 28.36 KG/M2 | DIASTOLIC BLOOD PRESSURE: 53 MMHG | SYSTOLIC BLOOD PRESSURE: 142 MMHG | WEIGHT: 191.5 LBS

## 2024-02-07 DIAGNOSIS — K40.90 LEFT INGUINAL HERNIA: ICD-10-CM

## 2024-02-07 PROCEDURE — C1781 MESH (IMPLANTABLE): HCPCS | Performed by: SURGERY

## 2024-02-07 PROCEDURE — 88302 TISSUE EXAM BY PATHOLOGIST: CPT | Performed by: SPECIALIST

## 2024-02-07 DEVICE — BARD MESH
Type: IMPLANTABLE DEVICE | Status: FUNCTIONAL
Brand: BARD MESH

## 2024-02-07 RX ORDER — DEXAMETHASONE SODIUM PHOSPHATE 10 MG/ML
INJECTION, SOLUTION INTRAMUSCULAR; INTRAVENOUS AS NEEDED
Status: DISCONTINUED | OUTPATIENT
Start: 2024-02-07 | End: 2024-02-07

## 2024-02-07 RX ORDER — HYDROMORPHONE HCL/PF 1 MG/ML
SYRINGE (ML) INJECTION AS NEEDED
Status: DISCONTINUED | OUTPATIENT
Start: 2024-02-07 | End: 2024-02-07

## 2024-02-07 RX ORDER — SODIUM CHLORIDE, SODIUM LACTATE, POTASSIUM CHLORIDE, CALCIUM CHLORIDE 600; 310; 30; 20 MG/100ML; MG/100ML; MG/100ML; MG/100ML
100 INJECTION, SOLUTION INTRAVENOUS CONTINUOUS
Status: DISCONTINUED | OUTPATIENT
Start: 2024-02-07 | End: 2024-02-07 | Stop reason: HOSPADM

## 2024-02-07 RX ORDER — OXYCODONE HYDROCHLORIDE AND ACETAMINOPHEN 5; 325 MG/1; MG/1
1 TABLET ORAL EVERY 4 HOURS PRN
Qty: 10 TABLET | Refills: 0 | Status: SHIPPED | OUTPATIENT
Start: 2024-02-07

## 2024-02-07 RX ORDER — CEFAZOLIN SODIUM 2 G/50ML
2000 SOLUTION INTRAVENOUS ONCE
Status: COMPLETED | OUTPATIENT
Start: 2024-02-07 | End: 2024-02-07

## 2024-02-07 RX ORDER — ONDANSETRON 2 MG/ML
INJECTION INTRAMUSCULAR; INTRAVENOUS AS NEEDED
Status: DISCONTINUED | OUTPATIENT
Start: 2024-02-07 | End: 2024-02-07

## 2024-02-07 RX ORDER — MAGNESIUM HYDROXIDE 1200 MG/15ML
LIQUID ORAL AS NEEDED
Status: DISCONTINUED | OUTPATIENT
Start: 2024-02-07 | End: 2024-02-07 | Stop reason: HOSPADM

## 2024-02-07 RX ORDER — ONDANSETRON 2 MG/ML
4 INJECTION INTRAMUSCULAR; INTRAVENOUS ONCE AS NEEDED
Status: DISCONTINUED | OUTPATIENT
Start: 2024-02-07 | End: 2024-02-07 | Stop reason: HOSPADM

## 2024-02-07 RX ORDER — OXYCODONE HYDROCHLORIDE AND ACETAMINOPHEN 5; 325 MG/1; MG/1
1 TABLET ORAL EVERY 4 HOURS PRN
Status: DISCONTINUED | OUTPATIENT
Start: 2024-02-07 | End: 2024-02-07 | Stop reason: HOSPADM

## 2024-02-07 RX ORDER — OXYCODONE HYDROCHLORIDE AND ACETAMINOPHEN 5; 325 MG/1; MG/1
1 TABLET ORAL ONCE AS NEEDED
Status: DISCONTINUED | OUTPATIENT
Start: 2024-02-07 | End: 2024-02-07 | Stop reason: HOSPADM

## 2024-02-07 RX ORDER — LIDOCAINE HYDROCHLORIDE AND EPINEPHRINE 10; 10 MG/ML; UG/ML
INJECTION, SOLUTION INFILTRATION; PERINEURAL AS NEEDED
Status: DISCONTINUED | OUTPATIENT
Start: 2024-02-07 | End: 2024-02-07 | Stop reason: HOSPADM

## 2024-02-07 RX ORDER — FENTANYL CITRATE/PF 50 MCG/ML
50 SYRINGE (ML) INJECTION
Status: DISCONTINUED | OUTPATIENT
Start: 2024-02-07 | End: 2024-02-07 | Stop reason: HOSPADM

## 2024-02-07 RX ORDER — PROPOFOL 10 MG/ML
INJECTION, EMULSION INTRAVENOUS AS NEEDED
Status: DISCONTINUED | OUTPATIENT
Start: 2024-02-07 | End: 2024-02-07

## 2024-02-07 RX ORDER — ONDANSETRON 2 MG/ML
4 INJECTION INTRAMUSCULAR; INTRAVENOUS EVERY 6 HOURS PRN
Status: DISCONTINUED | OUTPATIENT
Start: 2024-02-07 | End: 2024-02-07 | Stop reason: HOSPADM

## 2024-02-07 RX ORDER — FENTANYL CITRATE 50 UG/ML
INJECTION, SOLUTION INTRAMUSCULAR; INTRAVENOUS AS NEEDED
Status: DISCONTINUED | OUTPATIENT
Start: 2024-02-07 | End: 2024-02-07

## 2024-02-07 RX ORDER — SODIUM CHLORIDE, SODIUM LACTATE, POTASSIUM CHLORIDE, CALCIUM CHLORIDE 600; 310; 30; 20 MG/100ML; MG/100ML; MG/100ML; MG/100ML
100 INJECTION, SOLUTION INTRAVENOUS CONTINUOUS
Status: DISCONTINUED | OUTPATIENT
Start: 2024-02-07 | End: 2024-02-07

## 2024-02-07 RX ADMIN — CEFAZOLIN SODIUM 2000 MG: 2 SOLUTION INTRAVENOUS at 14:30

## 2024-02-07 RX ADMIN — FENTANYL CITRATE 25 MCG: 50 INJECTION, SOLUTION INTRAMUSCULAR; INTRAVENOUS at 14:35

## 2024-02-07 RX ADMIN — SODIUM CHLORIDE, SODIUM LACTATE, POTASSIUM CHLORIDE, AND CALCIUM CHLORIDE: .6; .31; .03; .02 INJECTION, SOLUTION INTRAVENOUS at 14:45

## 2024-02-07 RX ADMIN — SODIUM CHLORIDE, SODIUM LACTATE, POTASSIUM CHLORIDE, AND CALCIUM CHLORIDE: .6; .31; .03; .02 INJECTION, SOLUTION INTRAVENOUS at 14:20

## 2024-02-07 RX ADMIN — PROPOFOL 150 MG: 10 INJECTION, EMULSION INTRAVENOUS at 14:26

## 2024-02-07 RX ADMIN — PROPOFOL 50 MG: 10 INJECTION, EMULSION INTRAVENOUS at 14:27

## 2024-02-07 RX ADMIN — DEXAMETHASONE SODIUM PHOSPHATE 10 MG: 10 INJECTION, SOLUTION INTRAMUSCULAR; INTRAVENOUS at 14:30

## 2024-02-07 RX ADMIN — FENTANYL CITRATE 50 MCG: 50 INJECTION, SOLUTION INTRAMUSCULAR; INTRAVENOUS at 14:50

## 2024-02-07 RX ADMIN — HYDROMORPHONE HYDROCHLORIDE 0.5 MG: 1 INJECTION, SOLUTION INTRAMUSCULAR; INTRAVENOUS; SUBCUTANEOUS at 14:53

## 2024-02-07 RX ADMIN — ONDANSETRON 4 MG: 2 INJECTION INTRAMUSCULAR; INTRAVENOUS at 14:30

## 2024-02-07 RX ADMIN — FENTANYL CITRATE 25 MCG: 50 INJECTION, SOLUTION INTRAMUSCULAR; INTRAVENOUS at 14:30

## 2024-02-07 RX ADMIN — OXYCODONE HYDROCHLORIDE AND ACETAMINOPHEN 1 TABLET: 5; 325 TABLET ORAL at 16:21

## 2024-02-07 NOTE — ANESTHESIA PREPROCEDURE EVALUATION
Procedure:  REPAIR HERNIA INGUINAL (Left: Groin)    Relevant Problems   ANESTHESIA (within normal limits)      CARDIO (within normal limits)      MUSCULOSKELETAL   (+) Osteoarthritis of cervical spine   (+) Sciatica of left side      NEURO/PSYCH   (+) Reactive depression      PULMONARY (within normal limits)        Physical Exam    Airway    Mallampati score: I  TM Distance: >3 FB  Neck ROM: full     Dental   No notable dental hx     Cardiovascular  Rhythm: regular, Rate: normal    Pulmonary   Breath sounds clear to auscultation    Other Findings        Anesthesia Plan  ASA Score- 2     Anesthesia Type- general with ASA Monitors.         Additional Monitors:     Airway Plan: LMA.           Plan Factors-Exercise tolerance (METS): >4 METS.    Chart reviewed. EKG reviewed.  Existing labs reviewed. Patient summary reviewed.    Patient is not a current smoker.              Induction- intravenous.    Postoperative Plan- Plan for postoperative opioid use.     Informed Consent- Anesthetic plan and risks discussed with patient.  I personally reviewed this patient with the CRNA. Discussed and agreed on the Anesthesia Plan with the CRNA..

## 2024-02-07 NOTE — INTERVAL H&P NOTE
H&P reviewed. After examining the patient I find no changes in the patients condition since the H&P had been written.    Vitals:    02/07/24 1153   BP: 149/93   Pulse: 97   Resp: 18   Temp: 98.1 °F (36.7 °C)   SpO2: 99%

## 2024-02-07 NOTE — ANESTHESIA POSTPROCEDURE EVALUATION
Post-Op Assessment Note    CV Status:  Stable    Pain management: adequate       Mental Status:  Alert and awake   Hydration Status:  Euvolemic   PONV Controlled:  Controlled   Airway Patency:  Patent     Post Op Vitals Reviewed: Yes    No anethesia notable event occurred.    Staff: CRNA               BP   132/78   Temp   97.8   Pulse  62   Resp   16   SpO2   100%

## 2024-02-07 NOTE — OP NOTE
OPERATIVE REPORT  PATIENT NAME: Reese Nicole    :  1950  MRN: [id number]  Pt Location: [facility] OR ROOM 03    SURGERY DATE: 2024    Surgeons and Role:     * Robert Bloch, MD - Primary     * Shania Farr PA-C - Assisting    Preop Diagnosis:  Left inguinal hernia [K40.90]    Post-Op Diagnosis Codes:     * Left inguinal hernia [K40.90]    Procedure(s):  Left - REPAIR HERNIA INGUINAL    Specimen(s):  ID Type Source Tests Collected by Time Destination   1 : Cord Lipoma Tissue Lipoma of cord TISSUE EXAM Robert Bloch, MD 2024  2:58 PM        Estimated Blood Loss:   Minimal    Drains:  * No LDAs found *    Anesthesia Type:   General    Operative Indications:  Left inguinal hernia [K40.90]      Operative Findings:  Same, pantaloon    Complications:   None    Procedure and Technique:  The patient was identified by me and placed in supine position upon the operating room table.  General anesthesia was started with placement of an LMA.  Patient's abdomen was shaved and prepped and draped in a normal surgical manner.  Timeout was now done.  I marked out the proposed incision as well as anatomic landmarks and then gave 1% lidocaine with epinephrine.  Oblique incision is made and carried down with the Bovie down to the external Bleich aponeurosis.  This was infiltrated with local and then nicked and opened.  Cord was dissected free just lateral to the tubercle and a Penrose drain was placed around it.  Dissection in the cord showed fairly large lipoma of the cord which I dissected free and sent as specimen.  There was a small sac going about 3 cm past the internal ring.  This was dissected free and pushed underneath.  Turns out that there was also a large glob of fat coming through the floor.  The floor was closed with running Prolene.  A piece of mesh was now cut to size and the routine Lester repair was done.  This was sewn x 2 to the pubic tubercle.  Medially this was sewn to the arch of the transversus  and superiorly internus.  Laterally this was sewn to the shelving edge of Poupart's ligament.  The mesh was cut to size and the 2 pieces were folded over each other and sewn together.  I put a bolster stitch in on both sides about FCI down and also 1 U-stitch at the crotch.  Wounds were irrigated external Bleich was closed with running Vicryl.  It should be noted that during the case identified the ilioinguinal nerve and thought that it would be leg right on mesh so this was sacrificed and tied off.  Wounds were again irrigated and subcu was closed with interrupted Vicryl followed by Monocryl and Exofin.  There was no qualified resident to assist.  Accordingly, Shania PAULSON was the first assistant   I was present for the entire procedure. and A physician assistant was required during the procedure for retraction, tissue handling, dissection and suturing.    Patient Disposition:  PACU         SIGNATURE: Robert Bloch, MD  DATE: February 7, 2024  TIME: 3:25 PM

## 2024-02-09 ENCOUNTER — NURSE TRIAGE (OUTPATIENT)
Age: 74
End: 2024-02-09

## 2024-02-09 NOTE — TELEPHONE ENCOUNTER
Pt calling.   Procedure: Left inguinal hernia repair on 2/7/24.    Pt states he is having pain in left inguinal incision.  Pt states incision is open to air, has surgical glue in place.  Pt denies any fever, redness or drainage from incision.  Pt states pain is a 2/10 when resting, and a 9/10 when moving or if he touches the end of the incision.  Pt states he is drinking plenty of fluids and is urinating ever 2 hrs.  Pt states he has not taken Percocet due to concern of being constipated from it.  Pt has not taken any other OTC pain medication.      RN reassured pt that it is normal to have discomfort after surgery.  RN recommends taking Percocet for pain as ordered, since pain could become harder to control if not taken.  Can try regular Tylenol(Acetaminophen) , but need to space out 4 to 6 hrs from Percocet since this has Acetaminophen in it.  RN also advised that pt avoid touching or pushing on incision due to this causing pain and increased risk of infection.  ED precautions reviewed.  Pt verbalizes understanding.      Additional Information   Negative: Major abdominal surgical incision and wound gaping open with visible internal organs   Negative: Sounds like a life-threatening emergency to the triager   Negative: Bleeding from incision and won't stop after 10 minutes of direct pressure   Negative: Widespread rash and bright red, sunburn-like   Negative: Severe pain in the incision   Negative: Incision gaping open and < 2 days (48 hours) since wound re-opened   Negative: Incision gaping open and length of opening > 2 inches (5 cm)   Negative: Patient sounds very sick or weak to the triager   Negative: Sounds like a serious complication to the triager   Negative: Fever > 100.4 F (38.0 C)   Negative: Incision looks infected (spreading redness, pain)   Negative: Red streak runs from the incision and longer than 1 inch (2.5 cm)   Negative: Pus or bad-smelling fluid draining from incision   Negative: Dressing soaked  "with blood or body fluid (e.g., drainage)   Negative: Raised bruise and size > 2 inches (5 cm) and expanding   Negative: Caller has URGENT question and triager unable to answer question   Negative: Incision gaping open and length of opening > 1/4 inch (6 mm) and on the face and over 2 days since wound re-opened   Negative: Incision gaping open and length of opening > 1/2 inch (1 cm) and over 2 days since wound re-opened   Negative: Clear or blood-tinged fluid draining from wound and no fever   Negative: Suture or staple removal is overdue   Negative: INCREASING pain in incision and over 2 days (48 hours) since surgery   Negative: Suture or staple came out early and caller wants wound checked   Negative: Caller has NON-URGENT question and triager unable to answer question   Negative: Pimple where a stitch comes through the skin   Negative: Suture or staple came out early   Negative: Suture removal date, questions about   Negative: Skin tape (e.g., Steri-Strips) removal, questions about   Negative: Sutured or stapled surgical incision, questions about   Negative: Surgical incision after sutures or staples removed, questions about   Negative: Mild bruising near incision site    Answer Assessment - Initial Assessment Questions  1. SYMPTOM: \"What's the main symptom you're concerned about?\" (e.g., redness, pain, drainage)      pain  2. ONSET: \"When did pain  start?\"      2 days ago  3. SURGERY: \"What surgery was performed?\"      Left inguinal hernia repair   4. DATE of SURGERY: \"When was surgery performed?\"       2/7/24  5. INCISION SITE: \"Where is the incision located?\"       Left inguinal area  6. REDNESS: \"Is there any redness at the incision site?\" If yes, ask: \"How wide across is the redness?\" (Inches, centimeters)       Leeroy  7. PAIN: \"Is there any pain?\" If Yes, ask: \"How bad is it?\"  (Scale 1-10; or mild, moderate, severe)      Yes, 2/10 at rest and 9/10 with movement or touching incision  8. BLEEDING: \"Is there " "any bleeding?\" If Yes, ask: \"How much?\" and \"Where?\"      Denies  9. DRAINAGE: \"Is there any drainage from the incision site?\" If yes, ask: \"What color and how much?\" (e.g., red, cloudy, pus; drops, teaspoon)      Denies  10. FEVER: \"Do you have a fever?\" If Yes, ask: \"What is your temperature, how was it measured, and when did it start?\"        Denies  11. OTHER SYMPTOMS: \"Do you have any other symptoms?\" (e.g., shaking chills, weakness, rash elsewhere on body)        No    Protocols used: Post-Op Incision Symptoms and Questions-ADULT-[place]    "

## 2024-02-10 ENCOUNTER — NURSE TRIAGE (OUTPATIENT)
Dept: OTHER | Facility: OTHER | Age: 74
End: 2024-02-10

## 2024-02-10 ENCOUNTER — HOSPITAL ENCOUNTER (EMERGENCY)
Facility: HOSPITAL | Age: 74
Discharge: HOME | End: 2024-02-11
Attending: EMERGENCY MEDICINE
Payer: MEDICARE

## 2024-02-10 VITALS
RESPIRATION RATE: 18 BRPM | DIASTOLIC BLOOD PRESSURE: 76 MMHG | HEART RATE: 109 BPM | OXYGEN SATURATION: 96 % | TEMPERATURE: 98.7 F | SYSTOLIC BLOOD PRESSURE: 159 MMHG

## 2024-02-10 DIAGNOSIS — K40.90 LEFT INGUINAL HERNIA: ICD-10-CM

## 2024-02-10 DIAGNOSIS — G89.18 ACUTE POSTOPERATIVE PAIN: Primary | ICD-10-CM

## 2024-02-10 LAB
ANION GAP SERPL CALCULATED.3IONS-SCNC: 7 MMOL/L
BACTERIA UR QL AUTO: ABNORMAL /HPF
BASOPHILS # BLD AUTO: 0.06 THOUSANDS/ÂΜL (ref 0–0.1)
BASOPHILS NFR BLD AUTO: 1 % (ref 0–1)
BILIRUB UR QL STRIP: NEGATIVE
BUN SERPL-MCNC: 16 MG/DL (ref 5–25)
CALCIUM SERPL-MCNC: 9.7 MG/DL (ref 8.4–10.2)
CHLORIDE SERPL-SCNC: 103 MMOL/L (ref 96–108)
CLARITY UR: CLEAR
CO2 SERPL-SCNC: 28 MMOL/L (ref 21–32)
COLOR UR: YELLOW
CREAT SERPL-MCNC: 0.86 MG/DL (ref 0.6–1.3)
EOSINOPHIL # BLD AUTO: 0.43 THOUSAND/ÂΜL (ref 0–0.61)
EOSINOPHIL NFR BLD AUTO: 6 % (ref 0–6)
ERYTHROCYTE [DISTWIDTH] IN BLOOD BY AUTOMATED COUNT: 14.2 % (ref 11.6–15.1)
GFR SERPL CREATININE-BSD FRML MDRD: 86 ML/MIN/1.73SQ M
GLUCOSE SERPL-MCNC: 114 MG/DL (ref 65–140)
GLUCOSE UR STRIP-MCNC: NEGATIVE MG/DL
HCT VFR BLD AUTO: 47.4 % (ref 36.5–49.3)
HGB BLD-MCNC: 15.6 G/DL (ref 12–17)
HGB UR QL STRIP.AUTO: ABNORMAL
HYALINE CASTS #/AREA URNS LPF: ABNORMAL /LPF
IMM GRANULOCYTES # BLD AUTO: 0.03 THOUSAND/UL (ref 0–0.2)
IMM GRANULOCYTES NFR BLD AUTO: 0 % (ref 0–2)
KETONES UR STRIP-MCNC: NEGATIVE MG/DL
LEUKOCYTE ESTERASE UR QL STRIP: NEGATIVE
LYMPHOCYTES # BLD AUTO: 1.21 THOUSANDS/ÂΜL (ref 0.6–4.47)
LYMPHOCYTES NFR BLD AUTO: 16 % (ref 14–44)
MCH RBC QN AUTO: 28.7 PG (ref 26.8–34.3)
MCHC RBC AUTO-ENTMCNC: 32.9 G/DL (ref 31.4–37.4)
MCV RBC AUTO: 87 FL (ref 82–98)
MONOCYTES # BLD AUTO: 0.68 THOUSAND/ÂΜL (ref 0.17–1.22)
MONOCYTES NFR BLD AUTO: 9 % (ref 4–12)
MUCOUS THREADS UR QL AUTO: ABNORMAL
NEUTROPHILS # BLD AUTO: 5.42 THOUSANDS/ÂΜL (ref 1.85–7.62)
NEUTS SEG NFR BLD AUTO: 68 % (ref 43–75)
NITRITE UR QL STRIP: NEGATIVE
NON-SQ EPI CELLS URNS QL MICRO: ABNORMAL /HPF
NRBC BLD AUTO-RTO: 0 /100 WBCS
PH UR STRIP.AUTO: 5 [PH]
PLATELET # BLD AUTO: 315 THOUSANDS/UL (ref 149–390)
PMV BLD AUTO: 9.2 FL (ref 8.9–12.7)
POTASSIUM SERPL-SCNC: 3.7 MMOL/L (ref 3.5–5.3)
PROT UR STRIP-MCNC: NEGATIVE MG/DL
RBC # BLD AUTO: 5.44 MILLION/UL (ref 3.88–5.62)
RBC #/AREA URNS AUTO: ABNORMAL /HPF
SODIUM SERPL-SCNC: 138 MMOL/L (ref 135–147)
SP GR UR STRIP.AUTO: 1.02 (ref 1–1.03)
UROBILINOGEN UR STRIP-ACNC: <2 MG/DL
WBC # BLD AUTO: 7.83 THOUSAND/UL (ref 4.31–10.16)
WBC #/AREA URNS AUTO: ABNORMAL /HPF

## 2024-02-10 PROCEDURE — 99285 EMERGENCY DEPT VISIT HI MDM: CPT | Performed by: EMERGENCY MEDICINE

## 2024-02-10 PROCEDURE — 80048 BASIC METABOLIC PNL TOTAL CA: CPT

## 2024-02-10 PROCEDURE — 99283 EMERGENCY DEPT VISIT LOW MDM: CPT

## 2024-02-10 PROCEDURE — 85025 COMPLETE CBC W/AUTO DIFF WBC: CPT

## 2024-02-10 PROCEDURE — 81001 URINALYSIS AUTO W/SCOPE: CPT

## 2024-02-10 PROCEDURE — 36415 COLL VENOUS BLD VENIPUNCTURE: CPT

## 2024-02-10 NOTE — TELEPHONE ENCOUNTER
"Reason for Disposition  • [1] SEVERE post-op pain (e.g., excruciating, pain scale 8-10) AND [2] not controlled with pain medications    Answer Assessment - Initial Assessment Questions  1. SYMPTOM: \"What's the main symptom you're concerned about?\" (e.g., pain, fever, vomiting)      Having a lot of pain, maybe slightly decreased. Rx for Percocet almost out; patient reports scrotum is turning dark black and blue; groin is very swollen    2. ONSET: \"When did bruising  start?\"      A few hours ago    3. SURGERY: \"What surgery was performed?\"      REPAIR HERNIA INGUINAL (Left: Groin)    4. DATE of SURGERY: \"When was surgery performed?\"       2/7    5. ANESTHESIA: \" What type of anesthesia did you have?\" (e.g., general, spinal, epidural, local)      General    6. PAIN: \"Is there any pain?\" If Yes, ask: \"How bad is it?\"  (Scale 1-10; or mild, moderate, severe)      4 at rest; 10 with movement    7. FEVER: \"Do you have a fever?\" If Yes, ask: \"What is your temperature, how was it measured, and when did it start?\"      Denies    8. VOMITING: \"Is there any vomiting?\" If yes, ask: \"How many times?\"      Denies    9. BLEEDING: \"Is there any bleeding?\" If Yes, ask: \"How much?\" and \"Where?\"      Denies    10. OTHER SYMPTOMS: \"Do you have any other symptoms?\" (e.g., drainage from wound, painful urination, constipation)        Denies    Patient reports taking Colace to help with constipation. Patient took another over the counter medication and then had some diarrhea.    Protocols used: Post-Op Symptoms and Questions-ADULT-    " readmission: medical condition-HTN, GERD, Kidney Stones, Breast CA with Bone Metastasis. Interventions to address risk factors: Obtained and reviewed discharge summary and/or continuity of care documents, Education of patient/family/caregiver/guardian to support self-management-Catie Wang LPN -[phone], and all scheduled appointments. Offered patient enrollment in the Remote Patient Monitoring (RPM) program for in-home monitoring: NA.     Care Transitions Subsequent and Final Call    Subsequent and Final Calls  Do you have any ongoing symptoms?: No  Have your medications changed?: No  Do you have any questions related to your medications?: No  Do you currently have any active services?: No  Do you have any needs or concerns that I can assist you with?: No  Identified Barriers: None  Care Transitions Interventions  Other Interventions:             LPN Care Coordinator provided contact information for future needs. Plan for follow-up call in 7-10 days based on severity of symptoms and risk factors. Plan for next call: self management-diet, hydration, exercise, follow up appointments.      Josie Muniz LPN

## 2024-02-10 NOTE — TELEPHONE ENCOUNTER
Regarding: Post REPAIR HERNIA INGUINAL (Left: Groin) surgery concern/pain medication request  ----- Message from Jessica Wyman sent at 2/10/2024 12:03 PM EST -----  Pt calling stating he had REPAIR HERNIA INGUINAL (Left: Groin) surgery on on 2/7 and is experiencing a lot of swelling and his scrotum is turning black and blue. Pt is also requesting a script for pain medication.

## 2024-02-11 RX ORDER — OXYCODONE HYDROCHLORIDE AND ACETAMINOPHEN 5; 325 MG/1; MG/1
1 TABLET ORAL EVERY 4 HOURS PRN
Qty: 15 TABLET | Refills: 0 | Status: SHIPPED | OUTPATIENT
Start: 2024-02-11 | End: 2024-02-21

## 2024-02-11 NOTE — TELEPHONE ENCOUNTER
"Reason for Disposition  • SEVERE pain (e.g., excruciating)  • Swollen scrotum    Answer Assessment - Initial Assessment Questions  1. SYMPTOM: \"What's the main symptom you're concerned about?\" (e.g., pain, fever, vomiting)      Blackening scrotum  2. ONSET: \"When did it start?\"   2/10 earlier today   3. SURGERY: \"What surgery was performed?\"  s/p  REPAIR HERNIA INGUINAL (Left: Groin) on 2/7/24 with Robert Bloch, MD  4. DATE of SURGERY: \"When was surgery performed?\"    2/7  5. ANESTHESIA: \" What type of anesthesia did you have?\" (e.g., general, spinal, epidural, local)      General   6. PAIN: \"Is there any pain?\" If Yes, ask: \"How bad is it?\"  (Scale 1-10; or mild, moderate, severe)     10/10 despite pain medication . PERCOCET) 5-325 mg per tablet at 2000.   7. FEVER: \"Do you have a fever?\" If Yes, ask: \"What is your temperature, how was it measured, and when did it start?\"      Denies   8. VOMITING: \"Is there any vomiting?\" If yes, ask: \"How many times?\"      Denies   9. BLEEDING: \"Is there any bleeding?\" If Yes, ask: \"How much?\" and \"Where?\"      Denies   10. OTHER SYMPTOMS: \"Do you have any other symptoms?\" (e.g., drainage from wound, painful urination, constipation)        Scrotum severe edema    Protocols used: Post-Op Symptoms and Questions-ADULT-, Scrotal Pain-ADULT-    "

## 2024-02-11 NOTE — ED PROVIDER NOTES
"History  Chief Complaint   Patient presents with    Post-op Problem     Pt reports inguinal hernia repair on L side done 2/7. Reports increasing pain since returning home, pain at surgical site. Also reports scrotum is \"black, or black and blue\". Pt has been taking prescribed oxy Q4 w/o relief.      The pt is a 73 year old male who presents to ED for evaluation of scrotal discoloration, post-surgical check. The pt states he under went left inguinal hernia repair on 2/7. He reprots that today he noticed blackish discoloration to his left hemiscrotum. He is also reporting severe pain and tenderness to light touch to his incision site, particularly when in standing position. He states that even the waist band of his pants or underwear over the incision site was severally painful today, although he states the pain is currently not as severe. He notes he has been taking percocet every 4 hours scheduled for pain and has no run out of the medication. He also reports constipation and last BM was before his surgery. Denies wound discharge or drainage, testicular pain, fever, chills, recent injury or trauma, recent fall, difficulty urinating        Prior to Admission Medications   Prescriptions Last Dose Informant Patient Reported? Taking?   ALPRAZolam (XANAX) 0.5 mg tablet  Self No No   Sig: Take 1 tablet (0.5 mg total) by mouth 2 (two) times a day Pt states taking OD PRN   Pseudoephedrine-guaiFENesin -1200 MG TB12  Self Yes No   Sig: Take 1 tablet by mouth in the morning   aspirin (ECOTRIN LOW STRENGTH) 81 mg EC tablet  Self Yes No   Sig: Take 81 mg by mouth 2 (two) times a day   buPROPion (WELLBUTRIN XL) 300 mg 24 hr tablet   No No   Sig: Take 1 tablet (300 mg total) by mouth in the morning   celecoxib (CeleBREX) 200 mg capsule   No No   Sig: Take 1 capsule (200 mg total) by mouth 2 (two) times a day   ketoconazole (NIZORAL) 2 % cream  Self No No   Sig: Apply topically daily   Patient taking differently: Apply " topically daily as needed   omeprazole (PriLOSEC) 20 mg delayed release capsule  Self Yes No   Sig: Take 20 mg by mouth in the morning   oxyCODONE (ROXICODONE) 5 immediate release tablet   No No   Sig: Take 1 tablet (5 mg total) by mouth every 6 (six) hours as needed for severe pain For recent L sided TKR  Max Daily Amount: 20 mg   oxyCODONE-acetaminophen (PERCOCET) 5-325 mg per tablet   No No   Sig: Take 1 tablet by mouth every 4 (four) hours as needed for moderate pain for up to 10 doses Max Daily Amount: 6 tablets   oxymetazoline (AFRIN) 0.05 % nasal spray  Self Yes No   Si sprays into each nostril in the morning   zolpidem (AMBIEN) 5 mg tablet   No No   Sig: Take 1 tablet (5 mg total) by mouth daily at bedtime as needed for sleep      Facility-Administered Medications: None       Past Medical History:   Diagnosis Date    Anxiety     Arthritis 2010    Depression     GERD (gastroesophageal reflux disease)     Visual impairment     glasses       Past Surgical History:   Procedure Laterality Date    APPENDECTOMY      HERNIA REPAIR      JOINT REPLACEMENT  2022    full right knee    KNEE ARTHROSCOPY Bilateral     [place] RPR 1ST INGUN HRNA AGE 5 YRS/> REDUCIBLE Left 2024    Procedure: REPAIR HERNIA INGUINAL;  Surgeon: Robert Bloch, MD;  Location:  MAIN OR;  Service: General    REPLACEMENT TOTAL KNEE Right     WRIST SURGERY Left     L. Wrist--removed small bone and replaced w. tendon due to arthritis       Family History   Problem Relation Age of Onset    Lung disease Mother          at 89    Cancer Mother         lung cancer    Crohn's disease Father     Diabetes Sister     Heart failure Sister      I have reviewed and agree with the history as documented.    E-Cigarette/Vaping    E-Cigarette Use Never User      E-Cigarette/Vaping Substances     Social History     Tobacco Use    Smoking status: Never    Smokeless tobacco: Never   Vaping Use    Vaping status: Never Used   Substance Use Topics     Alcohol use: Yes     Alcohol/week: 2.0 standard drinks of alcohol     Types: 2 Glasses of wine per week     Comment: 1 glass wine/3 nights    Drug use: Never        Review of Systems   Constitutional:  Negative for chills and fever.   Respiratory:  Negative for shortness of breath.    Cardiovascular:  Negative for chest pain.   Gastrointestinal:  Positive for constipation. Negative for abdominal pain, nausea and vomiting.   Genitourinary:  Positive for scrotal swelling. Negative for difficulty urinating, dysuria, flank pain, genital sores, hematuria, penile discharge, penile pain, penile swelling and testicular pain.   Skin:  Positive for color change.   All other systems reviewed and are negative.      Physical Exam  ED Triage Vitals   Temperature Pulse Respirations Blood Pressure SpO2   02/10/24 2216 02/10/24 2216 02/10/24 2216 02/10/24 2216 02/10/24 2216   98.7 °F (37.1 °C) (!) 109 18 (!) 196/94 96 %      Temp Source Heart Rate Source Patient Position - Orthostatic VS BP Location FiO2 (%)   02/10/24 2216 02/10/24 2216 02/10/24 2216 02/10/24 2216 --   Oral Monitor Sitting Right arm       Pain Score       02/10/24 2218       2             Orthostatic Vital Signs  Vitals:    02/10/24 2216 02/10/24 2230   BP: (!) 196/94 159/76   Pulse: (!) 109    Patient Position - Orthostatic VS: Sitting Lying       Physical Exam  Vitals and nursing note reviewed. Exam conducted with a chaperone present.   Constitutional:       Appearance: Normal appearance. He is not diaphoretic.   HENT:      Head: Normocephalic and atraumatic.   Cardiovascular:      Rate and Rhythm: Normal rate and regular rhythm.      Pulses: Normal pulses.      Heart sounds: Normal heart sounds.   Pulmonary:      Effort: Pulmonary effort is normal. No respiratory distress.      Breath sounds: Normal breath sounds. No wheezing or rales.   Abdominal:      General: Abdomen is flat. Bowel sounds are normal.      Palpations: Abdomen is soft.      Tenderness: There  is no right CVA tenderness, left CVA tenderness, guarding or rebound.      Comments: 4cm surgical incision site to the left lower abdomen, clean and dry without any discharge or surrounding erythema. No palpable area of fluctuance. Mild tenderness to palpation over surgical incision.    Genitourinary:     Penis: Normal and circumcised.       Testes: Normal. Cremasteric reflex is present.         Right: Tenderness not present.         Left: Tenderness not present.      Epididymis:      Right: Normal.      Left: Normal.          Comments: About 2.5cm circular area of Purple ecchymosis to the left scrotum. No palpable hematoma in scrotum.  Testicles non tender to palpation.   Penis normal.  Diffuse swelling over the mons without tenderness to palpation.   Musculoskeletal:         General: No swelling, tenderness, deformity or signs of injury. Normal range of motion.      Right lower leg: No edema.      Left lower leg: No edema.   Lymphadenopathy:      Lower Body: No left inguinal adenopathy.   Skin:     General: Skin is warm and dry.   Neurological:      General: No focal deficit present.      Mental Status: He is alert and oriented to person, place, and time.   Psychiatric:         Mood and Affect: Mood normal.         Behavior: Behavior normal.         Thought Content: Thought content normal.         Judgment: Judgment normal.         ED Medications  Medications - No data to display    Diagnostic Studies  Results Reviewed       Procedure Component Value Units Date/Time    Urine Microscopic [[id number]]  (Abnormal) Collected: 02/10/24 2319    Lab Status: Final result Specimen: Urine, Clean Catch Updated: 02/10/24 2336     RBC, UA 1-2 /hpf      WBC, UA 1-2 /hpf      Epithelial Cells None Seen /hpf      Bacteria, UA None Seen /hpf      MUCUS THREADS Moderate     Hyaline Casts, UA 0-3 /lpf     UA w Reflex to Microscopic w Reflex to Culture [[id number]]  (Abnormal) Collected: 02/10/24 2319    Lab Status: Final result  Specimen: Urine, Clean Catch Updated: 02/10/24 2330     Color, UA Yellow     Clarity, UA Clear     Specific Gravity, UA 1.019     pH, UA 5.0     Leukocytes, UA Negative     Nitrite, UA Negative     Protein, UA Negative mg/dl      Glucose, UA Negative mg/dl      Ketones, UA Negative mg/dl      Urobilinogen, UA <2.0 mg/dl      Bilirubin, UA Negative     Occult Blood, UA Trace    Basic metabolic panel [[id number]] Collected: 02/10/24 2249    Lab Status: Final result Specimen: Blood from Arm, Right Updated: 02/10/24 2316     Sodium 138 mmol/L      Potassium 3.7 mmol/L      Chloride 103 mmol/L      CO2 28 mmol/L      ANION GAP 7 mmol/L      BUN 16 mg/dL      Creatinine 0.86 mg/dL      Glucose 114 mg/dL      Calcium 9.7 mg/dL      eGFR 86 ml/min/1.73sq m     Narrative:      National Kid[employer] guidelines for Chronic Kidney Disease (CKD):     Stage 1 with normal or high GFR (GFR > 90 mL/min/1.73 square meters)    Stage 2 Mild CKD (GFR = 60-89 mL/min/1.73 square meters)    Stage 3A Moderate CKD (GFR = 45-59 mL/min/1.73 square meters)    Stage 3B Moderate CKD (GFR = 30-44 mL/min/1.73 square meters)    Stage 4 Severe CKD (GFR = 15-29 mL/min/1.73 square meters)    Stage 5 End Stage CKD (GFR <15 mL/min/1.73 square meters)  Note: GFR calculation is accurate only with a steady state creatinine    CBC and differential [[id number]] Collected: 02/10/24 2249    Lab Status: Final result Specimen: Blood from Arm, Right Updated: 02/10/24 2303     WBC 7.83 Thousand/uL      RBC 5.44 Million/uL      Hemoglobin 15.6 g/dL      Hematocrit 47.4 %      MCV 87 fL      MCH 28.7 pg      MCHC 32.9 g/dL      RDW 14.2 %      MPV 9.2 fL      Platelets 315 Thousands/uL      nRBC 0 /100 WBCs      Neutrophils Relative 68 %      Immat GRANS % 0 %      Lymphocytes Relative 16 %      Monocytes Relative 9 %      Eosinophils Relative 6 %      Basophils Relative 1 %      Neutrophils Absolute 5.42 Thousands/µL      Immature Grans Absolute 0.03  Thousand/uL      Lymphocytes Absolute 1.21 Thousands/µL      Monocytes Absolute 0.68 Thousand/µL      Eosinophils Absolute 0.43 Thousand/µL      Basophils Absolute 0.06 Thousands/µL                    No orders to display         Procedures  Procedures      ED Course  ED Course as of 02/13/24 0144   Sat Feb 10, 2024   2318 Basic metabolic panel  WNL   2318 CBC and differential  WNL   Sun Feb 11, 2024   0033 UA w Reflex to Microscopic w Reflex to Culture(!)  No sign of infection   0114 Contacted [facility] on call Dr Gould who agrees with plan for reassurance and follow up with DR Bloch this week, and to refill percocet prescription.                                        Medical Decision Making  Ddx: post surgical hematoma, wound infection, abscess    Incision clean and dry, no discharge or surrounding erythema. No palpable abscess or fluctuance. No sign of infection  Testicles non tender to palpation. Cremasteric reflex intact. Ecchymosis to the left hemiscrotum without palpable hematoma.  UA with no sign of infection. Trace occult blood.  CBC, Bmp WNL.  Pt afebrile.   Reached out to Dr Gould who was on call with photos of incision site and ecchymosis who agrees with plan for reassurance and follow up with DR Bloch this week, and to refill percocet prescription. No urgent surgical intervention needed at this time.     Amount and/or Complexity of Data Reviewed  Labs: ordered. Decision-making details documented in ED Course.  Radiology: ordered.    Risk  Prescription drug management.          Disposition  Final diagnoses:   Acute postoperative pain     Time reflects when diagnosis was documented in both MDM as applicable and the Disposition within this note       Time User Action Codes Description Comment    2/11/2024 12:11 AM Elena Ramirez Add [G89.18] Acute postoperative pain     2/11/2024  1:18 AM Jacquelyn Polk Add [G89.18] Post-operative pain     2/11/2024  1:18 AM Jacquelyn Polk  [G89.18] Post-operative pain     2/11/2024  1:22 AM Elena Ramirez Add [K40.90] Left inguinal hernia           ED Disposition       ED Disposition   Discharge    Condition   Stable    Date/Time   Sun Feb 11, 2024  1:19 AM    Comment   Reese Nicole discharge to home/self care.                   Follow-up Information       Follow up With Specialties Details Why Contact Info    Robert Bloch, MD General Surgery In 1 week please call to schedule an appointment for follow up in the next one to two weeks following your surgery [address] [place] [id number]  [phone]              Discharge Medication List as of 2/11/2024  1:25 AM        START taking these medications    Details   !! oxyCODONE-acetaminophen (PERCOCET) 5-325 mg per tablet Take 1 tablet by mouth every 4 (four) hours as needed for moderate pain for up to 10 days Max Daily Amount: 6 tablets, Starting Sun 2/11/2024, Until Wed 2/21/2024 at 2359, Normal       !! - Potential duplicate medications found. Please discuss with provider.        CONTINUE these medications which have NOT CHANGED    Details   ALPRAZolam (XANAX) 0.5 mg tablet Take 1 tablet (0.5 mg total) by mouth 2 (two) times a day Pt states taking OD PRN, Starting Tue 9/12/2023, Normal      aspirin (ECOTRIN LOW STRENGTH) 81 mg EC tablet Take 81 mg by mouth 2 (two) times a day, Starting Tue 11/28/2023, Until Wed 2/7/2024, Historical Med      buPROPion (WELLBUTRIN XL) 300 mg 24 hr tablet Take 1 tablet (300 mg total) by mouth in the morning, Starting Tue 1/2/2024, Fill Later      celecoxib (CeleBREX) 200 mg capsule Take 1 capsule (200 mg total) by mouth 2 (two) times a day, Starting Tue 1/2/2024, Fill Later      ketoconazole (NIZORAL) 2 % cream Apply topically daily, Starting Tue 9/12/2023, Normal      omeprazole (PriLOSEC) 20 mg delayed release capsule Take 20 mg by mouth in the morning, Starting Fri 1/1/2010, Historical Med      oxyCODONE (ROXICODONE) 5 immediate release tablet Take 1 tablet (5 mg  total) by mouth every 6 (six) hours as needed for severe pain For recent L sided TKR  Max Daily Amount: 20 mg, Starting Tue 1/2/2024, Normal      !! oxyCODONE-acetaminophen (PERCOCET) 5-325 mg per tablet Take 1 tablet by mouth every 4 (four) hours as needed for moderate pain for up to 10 doses Max Daily Amount: 6 tablets, Starting Wed 2/7/2024, Normal      oxymetazoline (AFRIN) 0.05 % nasal spray 2 sprays into each nostril in the morning, Historical Med      Pseudoephedrine-guaiFENesin -1200 MG TB12 Take 1 tablet by mouth in the morning, Historical Med      zolpidem (AMBIEN) 5 mg tablet Take 1 tablet (5 mg total) by mouth daily at bedtime as needed for sleep, Starting Tue 1/2/2024, Normal       !! - Potential duplicate medications found. Please discuss with provider.        No discharge procedures on file.    PDMP Review         Value Time User    PDMP Reviewed  Yes 1/2/2024  3:58 PM JESSICA Cannon, DO             ED Provider  Attending physically available and evaluated Reese Nicole. I managed the patient along with the ED Attending.    Electronically Signed by           Jacquelyn Polk MD  02/13/24 0144

## 2024-02-11 NOTE — TELEPHONE ENCOUNTER
"Regarding: Post op issue  ----- Message from Maicol Cruz sent at 2/10/2024  9:19 PM EST -----  \"I am still in a lot of pain and my scrotum is turning black.\"    "

## 2024-02-11 NOTE — ED ATTENDING ATTESTATION
2/10/2024  I, Elena Ramirez, DO, saw and evaluated the patient. I have discussed the patient with the resident/non-physician practitioner and agree with the resident's/non-physician practitioner's findings, Plan of Care, and MDM as documented in the resident's/non-physician practitioner's note, except where noted. All available labs and Radiology studies were reviewed.  I was present for key portions of any procedure(s) performed by the resident/non-physician practitioner and I was immediately available to provide assistance.       At this point I agree with the current assessment done in the Emergency Department.  I have conducted an independent evaluation of this patient a history and physical is as follows:    ED Course   73 year old male presents to the ED for evaluation of left inguinal pain s/p inguinal hernia repair on 2/7/24 with Dr. Bloch.  Patient reports increased pain and swelling at the incision site.  Patient denies fevers or chills. No opening or drainage from the wound.  Patient took percocet at 8pm and currently has pain 2/10 - reports this is much improved compared to earlier today.  Patient notes pain occurs mostly with movement and is very well-controlled with rest.  He was concerned about ecchymosis developing in the left scrotum today.  He denies testicular pain.  He has been able to urinate without difficulty.  Patient does endorse having difficulty with bowel movements.  He has been eating very little and has been constipated.  He did start taking Colace today.    Past medical history: Left knee total knee replacement, left inguinal hernia repair    Physical exam: Patient is awake and alert, nontoxic and afebrile.  Pupils equal and reactive.  Mucous membranes are moist.  Neck is supple.  Heart is regular rate and rhythm, he is not tachycardic on my exam.  Lungs are clear to auscultation.  Abdomen is soft, there is a surgical wound to the left lower quadrant that is clean dry and, skin adhesive  intact.  There is tenderness adjacent to the incision without rebound or guarding.  Incision is without dehiscence.  The left hemiscrotum is ecchymotic.  No discrete hematoma palpable.  Left testicle and right testicle are nontender.  Normal cremasteric reflexes noted.  Penis normal without swelling.  Left knee with surgical incision well-healed along midline.  No lower extremity edema.  No focal motor or sensory deficits.  Speech is clear and appropriate.    Assessment/Plan: 73-year-old male presents with postoperative pain, 3 days postop from left inguinal hernia repair with mesh.  Patient has a reassuring exam.  He has a nonsurgical abdomen, his incisions are healing well.  He has normal expected ecchymosis in the hemiscrotum on the side on the side of the surgical procedure.  He is nontoxic and afebrile.  He does report running out of his Percocet for pain.  He does does have adequate pain control when taking the medication.  I Will prescribe a few more tablets for patient to use for pain control until he is reevaluated by his surgeon.  Pictures and case description sent via [place] text to Dr. Bloch's partner Dr. Gould who agrees with discharge plan. -Given strict return precautions.    Critical Care Time  Procedures

## 2024-02-11 NOTE — TELEPHONE ENCOUNTER
s/p  REPAIR HERNIA INGUINAL (Left: Groin) on 2/7/24 with Robert Bloch, MD. Blackening scrotum noted today, patient states it is not black and blue, but actually turning black, with severe edema. Pain level 10/10 despite taking Percocet 5-325MG at 2000.   No additional symptoms.     On call provider contacted and informed of patient's concerns and status.    Provider recommends proceeding to ED for evaluation.     Informed of provider's recommendation, along with care advice. Verbalized understanding. Agreeable with disposition. No further questions.    Patient states will call EMS due to no transportation

## 2024-02-12 PROCEDURE — 88302 TISSUE EXAM BY PATHOLOGIST: CPT | Performed by: SPECIALIST

## 2024-02-14 DIAGNOSIS — F32.9 REACTIVE DEPRESSION: ICD-10-CM

## 2024-02-14 RX ORDER — BUPROPION HYDROCHLORIDE 300 MG/1
300 TABLET ORAL DAILY
Qty: 90 TABLET | Refills: 2 | Status: SHIPPED | OUTPATIENT
Start: 2024-02-14

## 2024-03-05 DIAGNOSIS — M47.812 OSTEOARTHRITIS OF CERVICAL SPINE, UNSPECIFIED SPINAL OSTEOARTHRITIS COMPLICATION STATUS: ICD-10-CM

## 2024-03-05 RX ORDER — CELECOXIB 200 MG/1
200 CAPSULE ORAL 2 TIMES DAILY
Qty: 90 CAPSULE | Refills: 0 | Status: SHIPPED | OUTPATIENT
Start: 2024-03-05

## 2024-03-11 DIAGNOSIS — Z09 HOSPITAL DISCHARGE FOLLOW-UP: ICD-10-CM

## 2024-03-11 DIAGNOSIS — F51.01 PRIMARY INSOMNIA: ICD-10-CM

## 2024-03-11 RX ORDER — ZOLPIDEM TARTRATE 5 MG/1
5 TABLET ORAL
Qty: 60 TABLET | Refills: 0 | Status: SHIPPED | OUTPATIENT
Start: 2024-03-11

## 2024-03-11 NOTE — TELEPHONE ENCOUNTER
Pt left msg stating     Traveling for 4 weeks and need medications, sleeping pills and antibiotic for sinus infection to have with me if needed.

## 2024-03-11 NOTE — TELEPHONE ENCOUNTER
Called and spoke w pt -- he reports he is travelling to [place] and then [place] -- needs Refill on Zolpedem. Pt was curious as to whether or not he can be prescribed an abx if he gets a sinus infection while on vacation -- advised to discuss further at time of office visit on 3/25/24 as he leaves for vacation on 3/30/24

## 2024-03-13 ENCOUNTER — OFFICE VISIT (OUTPATIENT)
Dept: FAMILY MEDICINE CLINIC | Facility: CLINIC | Age: 74
End: 2024-03-13
Payer: MEDICARE

## 2024-03-13 VITALS
HEART RATE: 74 BPM | WEIGHT: 195.2 LBS | HEIGHT: 69 IN | SYSTOLIC BLOOD PRESSURE: 136 MMHG | BODY MASS INDEX: 28.91 KG/M2 | OXYGEN SATURATION: 97 % | DIASTOLIC BLOOD PRESSURE: 84 MMHG | TEMPERATURE: 98.6 F

## 2024-03-13 DIAGNOSIS — J01.41 ACUTE RECURRENT PANSINUSITIS: Primary | ICD-10-CM

## 2024-03-13 DIAGNOSIS — F32.9 REACTIVE DEPRESSION: ICD-10-CM

## 2024-03-13 DIAGNOSIS — Z79.899 MEDICATION MANAGEMENT: ICD-10-CM

## 2024-03-13 DIAGNOSIS — K40.90 LEFT INGUINAL HERNIA: ICD-10-CM

## 2024-03-13 DIAGNOSIS — Z23 NEED FOR VACCINATION: ICD-10-CM

## 2024-03-13 DIAGNOSIS — Z79.899 ENCOUNTER FOR LONG-TERM (CURRENT) USE OF MEDICATIONS: ICD-10-CM

## 2024-03-13 PROBLEM — F11.20 CONTINUOUS OPIOID DEPENDENCE (HCC): Status: ACTIVE | Noted: 2024-03-13

## 2024-03-13 PROCEDURE — 99213 OFFICE O/P EST LOW 20 MIN: CPT | Performed by: FAMILY MEDICINE

## 2024-03-13 PROCEDURE — G2211 COMPLEX E/M VISIT ADD ON: HCPCS | Performed by: FAMILY MEDICINE

## 2024-03-13 RX ORDER — AMOXICILLIN 875 MG/1
875 TABLET, COATED ORAL 2 TIMES DAILY
Qty: 20 TABLET | Refills: 0 | Status: SHIPPED | OUTPATIENT
Start: 2024-03-13 | End: 2024-03-23

## 2024-03-13 NOTE — PROGRESS NOTES
Assessment/Plan:          1. Acute recurrent pansinusitis  Comments:  Will use Amoxil 875 mg BID if and when it recurs.  Orders:  -     amoxicillin (AMOXIL) 875 mg tablet; Take 1 tablet (875 mg total) by mouth 2 (two) times a day for 10 days    2. Reactive depression    3. Medication management  Assessment & Plan:  All medications were reviewed for safety, efficacy, and tolerance.       4. Left inguinal hernia    5. Encounter for long-term (current) use of medications    6. Need for vaccination  Comments:  do get the Shringx  Assessment & Plan:  He was advised to receive the Shingrix vaccine at his preferred pharmacy and to get a follow-up dose in 4 to 6 months. He was also instructed to manage potential arm soreness, which may last for 3 days, by applying cold compresses or taking a small dose of Motrin.          Post-herniorrhaphy.  His condition has improved. He was instructed to maintain regular bowel movements and increase water intake.     Sciatica.  He has discontinued all opioid medications. He was advised to take oxycodone as needed.    Post-knee surgery.  He was informed that the condition will improve over time. Application of cocoa butter on the surgical site was recommended 2 times daily to soften the scar.    Return to the office in 2 to 3 months for a Medicare Wellness Visit.                  Subjective:       Patient ID: Reese Nicole is a 73 y.o. male who presents for a follow-up.    He requests antibiotics for a sinus infection to take on a trip. He has a history of recurrent sinusitis, experiencing 2 to 3 episodes annually. He has a history of chronic Afrin use since his teenage years and was still using for years, leading to dependency. He believes he was treated with amoxicillin 2 months ago, which improved his symptoms after a month of use.    His sciatica is no longer a concern, and he has discontinued oxycodone use.    Post-herniorrhaphy, he experienced significant pain, leading to an  "emergency room visit. He had constipation and inability to defecate. He reported loud gurgling sounds from his abdomen, persisting for 1.5 days. His bowel function has since normalized, and he is currently on MiraLAX and Dulcolax.    He underwent left knee surgery performed by Dr. Thomas 4 weeks ago. The surgical scar appears to be healing well.    He received a shingles vaccine approximately 10 years ago.    The following portions of the patient's history were reviewed and updated as appropriate: allergies, current medications, past family history, past medical history, past social history, past surgical history, and problem list.            Objective:       /84 (BP Location: Left arm, Patient Position: Sitting, Cuff Size: Standard)   Pulse 74   Temp 98.6 °F (37 °C) (Temporal)   Ht 5' 9\" (1.753 m)   Wt 88.5 kg (195 lb 3.2 oz)   SpO2 97%   BMI 28.83 kg/m²          Physical Exam  Vitals and nursing note reviewed.   Constitutional:       General: He is not in acute distress.     Appearance: Normal appearance. He is well-developed.   HENT:      Head: Normocephalic and atraumatic.   Eyes:      General:         Right eye: No discharge.         Left eye: No discharge.   Neck:      Thyroid: No thyromegaly.   Cardiovascular:      Rate and Rhythm: Normal rate and regular rhythm.      Pulses: Normal pulses.      Heart sounds: Normal heart sounds. No murmur heard.  Pulmonary:      Effort: Pulmonary effort is normal.      Breath sounds: Normal breath sounds. No wheezing or rhonchi.   Musculoskeletal:      Cervical back: Neck supple.      Right lower leg: No edema.      Left lower leg: No edema.   Lymphadenopathy:      Cervical: No cervical adenopathy.   Skin:     General: Skin is warm.      Capillary Refill: Capillary refill takes less than 2 seconds.   Neurological:      General: No focal deficit present.      Mental Status: He is alert and oriented to person, place, and time.   Psychiatric:         Mood and Affect: " Mood normal.         Behavior: Behavior normal.         Thought Content: Thought content normal.          I personally reviewed the recent (and prior)  lab results, the image studies, pathology, other specialty/physicians consult notes and recommendations, and outside medical records from other institutions, as appropriate. I had a lengthy discussion with the patient and shared the work-up findings. We discussed the diagnosis and management plan.  I spent  30   minutes reviewing the records (labs, clinician notes, outside records, medical history, ordering medicine/tests/procedures, interpreting the imaging/labs previously done) and coordination of care as well as direct time with the patient today, of which greater than 50% of the time was spent in counseling and coordination of care with the patient/family.    Transcribed for JESSICA Cannon DO, by Nile  on 03/17/24 at 6:06 PM. Powered by Dragon Ambient [employer].

## 2024-03-14 NOTE — ASSESSMENT & PLAN NOTE
He was advised to receive the Shingrix vaccine at his preferred pharmacy and to get a follow-up dose in 4 to 6 months. He was also instructed to manage potential arm soreness, which may last for 3 days, by applying cold compresses or taking a small dose of Motrin.

## 2024-04-20 DIAGNOSIS — F51.01 PRIMARY INSOMNIA: ICD-10-CM

## 2024-04-20 DIAGNOSIS — Z09 HOSPITAL DISCHARGE FOLLOW-UP: ICD-10-CM

## 2024-04-21 RX ORDER — ZOLPIDEM TARTRATE 5 MG/1
5 TABLET ORAL
Qty: 60 TABLET | Refills: 0 | Status: SHIPPED | OUTPATIENT
Start: 2024-04-21 | End: 2024-04-24

## 2024-04-23 NOTE — TELEPHONE ENCOUNTER
Patient called the RX Refill Line.  Message is being forwarded to the office.     Patient is requesting   New script for zolpidem tartrate to reflect the fact that he is taking TWO tablets at bed time. New sript was written for 60 tabs but only stating he takes it once a day. Pharmacy will not let him fill the script cause it has the old sig on it.    Please contact patient at   [phone]

## 2024-04-24 DIAGNOSIS — F51.01 PRIMARY INSOMNIA: ICD-10-CM

## 2024-04-24 DIAGNOSIS — Z09 HOSPITAL DISCHARGE FOLLOW-UP: ICD-10-CM

## 2024-04-24 RX ORDER — ZOLPIDEM TARTRATE 10 MG/1
10 TABLET ORAL
Qty: 30 TABLET | Refills: 0 | Status: SHIPPED | OUTPATIENT
Start: 2024-04-24

## 2024-04-24 NOTE — TELEPHONE ENCOUNTER
Medication pended for new dose of Ambien 10 mg by mouth daily at bedtime as needed for sleep. Sent to PCP for approval.

## 2024-05-01 PROBLEM — J01.41 ACUTE RECURRENT PANSINUSITIS: Status: RESOLVED | Noted: 2024-03-13 | Resolved: 2024-05-01

## 2024-05-18 ENCOUNTER — HOSPITAL ENCOUNTER (OUTPATIENT)
Facility: HOSPITAL | Age: 74
Setting detail: OBSERVATION
Discharge: HOME | End: 2024-05-18
Attending: EMERGENCY MEDICINE | Admitting: INTERNAL MEDICINE
Payer: MEDICARE

## 2024-05-18 ENCOUNTER — APPOINTMENT (EMERGENCY)
Dept: RADIOLOGY | Facility: HOSPITAL | Age: 74
End: 2024-05-18
Payer: MEDICARE

## 2024-05-18 ENCOUNTER — APPOINTMENT (EMERGENCY)
Dept: CT IMAGING | Facility: HOSPITAL | Age: 74
End: 2024-05-18
Payer: MEDICARE

## 2024-05-18 VITALS
BODY MASS INDEX: 28.9 KG/M2 | WEIGHT: 195.11 LBS | TEMPERATURE: 98.1 F | HEIGHT: 69 IN | OXYGEN SATURATION: 97 % | RESPIRATION RATE: 18 BRPM | SYSTOLIC BLOOD PRESSURE: 122 MMHG | DIASTOLIC BLOOD PRESSURE: 72 MMHG | HEART RATE: 89 BPM

## 2024-05-18 DIAGNOSIS — J12.82 PNEUMONIA DUE TO COVID-19 VIRUS: Primary | ICD-10-CM

## 2024-05-18 DIAGNOSIS — U07.1 PNEUMONIA DUE TO COVID-19 VIRUS: Primary | ICD-10-CM

## 2024-05-18 DIAGNOSIS — J02.9 PHARYNGITIS: ICD-10-CM

## 2024-05-18 DIAGNOSIS — J32.9 SINUSITIS: ICD-10-CM

## 2024-05-18 DIAGNOSIS — E87.1 HYPONATREMIA: ICD-10-CM

## 2024-05-18 LAB
ALBUMIN SERPL BCP-MCNC: 4.2 G/DL (ref 3.5–5)
ALP SERPL-CCNC: 79 U/L (ref 34–104)
ALT SERPL W P-5'-P-CCNC: 19 U/L (ref 7–52)
ANION GAP SERPL CALCULATED.3IONS-SCNC: 10 MMOL/L (ref 4–13)
ANION GAP SERPL CALCULATED.3IONS-SCNC: 8 MMOL/L (ref 4–13)
APTT PPP: 39 SECONDS (ref 23–37)
AST SERPL W P-5'-P-CCNC: 18 U/L (ref 13–39)
BASOPHILS # BLD AUTO: 0.03 THOUSANDS/ÂΜL (ref 0–0.1)
BASOPHILS NFR BLD AUTO: 1 % (ref 0–1)
BILIRUB SERPL-MCNC: 0.37 MG/DL (ref 0.2–1)
BUN SERPL-MCNC: 14 MG/DL (ref 5–25)
BUN SERPL-MCNC: 15 MG/DL (ref 5–25)
CALCIUM SERPL-MCNC: 8.8 MG/DL (ref 8.4–10.2)
CALCIUM SERPL-MCNC: 9.3 MG/DL (ref 8.4–10.2)
CHLORIDE SERPL-SCNC: 98 MMOL/L (ref 96–108)
CHLORIDE SERPL-SCNC: 99 MMOL/L (ref 96–108)
CO2 SERPL-SCNC: 22 MMOL/L (ref 21–32)
CO2 SERPL-SCNC: 26 MMOL/L (ref 21–32)
CREAT SERPL-MCNC: 0.9 MG/DL (ref 0.6–1.3)
CREAT SERPL-MCNC: 1.04 MG/DL (ref 0.6–1.3)
EOSINOPHIL # BLD AUTO: 0 THOUSAND/ÂΜL (ref 0–0.61)
EOSINOPHIL NFR BLD AUTO: 0 % (ref 0–6)
ERYTHROCYTE [DISTWIDTH] IN BLOOD BY AUTOMATED COUNT: 14 % (ref 11.6–15.1)
FLUAV RNA RESP QL NAA+PROBE: NEGATIVE
FLUBV RNA RESP QL NAA+PROBE: NEGATIVE
GFR SERPL CREATININE-BSD FRML MDRD: 70 ML/MIN/1.73SQ M
GFR SERPL CREATININE-BSD FRML MDRD: 84 ML/MIN/1.73SQ M
GLUCOSE SERPL-MCNC: 136 MG/DL (ref 65–140)
GLUCOSE SERPL-MCNC: 149 MG/DL (ref 65–140)
HCT VFR BLD AUTO: 47.7 % (ref 36.5–49.3)
HETEROPH AB SER QL: NEGATIVE
HGB BLD-MCNC: 15.8 G/DL (ref 12–17)
IMM GRANULOCYTES # BLD AUTO: 0.02 THOUSAND/UL (ref 0–0.2)
IMM GRANULOCYTES NFR BLD AUTO: 0 % (ref 0–2)
INR PPP: 1.02 (ref 0.84–1.19)
LACTATE SERPL-SCNC: 1.2 MMOL/L (ref 0.5–2)
LYMPHOCYTES # BLD AUTO: 0.67 THOUSANDS/ÂΜL (ref 0.6–4.47)
LYMPHOCYTES NFR BLD AUTO: 11 % (ref 14–44)
MCH RBC QN AUTO: 28.1 PG (ref 26.8–34.3)
MCHC RBC AUTO-ENTMCNC: 33.1 G/DL (ref 31.4–37.4)
MCV RBC AUTO: 85 FL (ref 82–98)
MONOCYTES # BLD AUTO: 0.63 THOUSAND/ÂΜL (ref 0.17–1.22)
MONOCYTES NFR BLD AUTO: 10 % (ref 4–12)
NEUTROPHILS # BLD AUTO: 4.95 THOUSANDS/ÂΜL (ref 1.85–7.62)
NEUTS SEG NFR BLD AUTO: 78 % (ref 43–75)
NRBC BLD AUTO-RTO: 0 /100 WBCS
PLATELET # BLD AUTO: 223 THOUSANDS/UL (ref 149–390)
PMV BLD AUTO: 9.9 FL (ref 8.9–12.7)
POTASSIUM SERPL-SCNC: 3.8 MMOL/L (ref 3.5–5.3)
POTASSIUM SERPL-SCNC: 4.1 MMOL/L (ref 3.5–5.3)
PROCALCITONIN SERPL-MCNC: <0.05 NG/ML
PROT SERPL-MCNC: 7.9 G/DL (ref 6.4–8.4)
PROTHROMBIN TIME: 14 SECONDS (ref 11.6–14.5)
RBC # BLD AUTO: 5.62 MILLION/UL (ref 3.88–5.62)
RSV RNA RESP QL NAA+PROBE: NEGATIVE
S PYO DNA THROAT QL NAA+PROBE: NOT DETECTED
SARS-COV-2 RNA RESP QL NAA+PROBE: POSITIVE
SODIUM SERPL-SCNC: 131 MMOL/L (ref 135–147)
SODIUM SERPL-SCNC: 132 MMOL/L (ref 135–147)
WBC # BLD AUTO: 6.3 THOUSAND/UL (ref 4.31–10.16)

## 2024-05-18 PROCEDURE — 83605 ASSAY OF LACTIC ACID: CPT | Performed by: EMERGENCY MEDICINE

## 2024-05-18 PROCEDURE — 96375 TX/PRO/DX INJ NEW DRUG ADDON: CPT

## 2024-05-18 PROCEDURE — 84145 PROCALCITONIN (PCT): CPT | Performed by: EMERGENCY MEDICINE

## 2024-05-18 PROCEDURE — 70491 CT SOFT TISSUE NECK W/DYE: CPT

## 2024-05-18 PROCEDURE — 36415 COLL VENOUS BLD VENIPUNCTURE: CPT | Performed by: EMERGENCY MEDICINE

## 2024-05-18 PROCEDURE — 86308 HETEROPHILE ANTIBODY SCREEN: CPT | Performed by: EMERGENCY MEDICINE

## 2024-05-18 PROCEDURE — 85730 THROMBOPLASTIN TIME PARTIAL: CPT | Performed by: EMERGENCY MEDICINE

## 2024-05-18 PROCEDURE — 99285 EMERGENCY DEPT VISIT HI MDM: CPT | Performed by: EMERGENCY MEDICINE

## 2024-05-18 PROCEDURE — 87040 BLOOD CULTURE FOR BACTERIA: CPT | Performed by: EMERGENCY MEDICINE

## 2024-05-18 PROCEDURE — 80053 COMPREHEN METABOLIC PANEL: CPT | Performed by: EMERGENCY MEDICINE

## 2024-05-18 PROCEDURE — 96365 THER/PROPH/DIAG IV INF INIT: CPT

## 2024-05-18 PROCEDURE — 70450 CT HEAD/BRAIN W/O DYE: CPT

## 2024-05-18 PROCEDURE — 96367 TX/PROPH/DG ADDL SEQ IV INF: CPT

## 2024-05-18 PROCEDURE — 85025 COMPLETE CBC W/AUTO DIFF WBC: CPT | Performed by: EMERGENCY MEDICINE

## 2024-05-18 PROCEDURE — 85610 PROTHROMBIN TIME: CPT | Performed by: EMERGENCY MEDICINE

## 2024-05-18 PROCEDURE — 87651 STREP A DNA AMP PROBE: CPT | Performed by: EMERGENCY MEDICINE

## 2024-05-18 PROCEDURE — 71045 X-RAY EXAM CHEST 1 VIEW: CPT

## 2024-05-18 PROCEDURE — 0241U HB NFCT DS VIR RESP RNA 4 TRGT: CPT | Performed by: EMERGENCY MEDICINE

## 2024-05-18 PROCEDURE — 80048 BASIC METABOLIC PNL TOTAL CA: CPT | Performed by: INTERNAL MEDICINE

## 2024-05-18 PROCEDURE — 99284 EMERGENCY DEPT VISIT MOD MDM: CPT

## 2024-05-18 PROCEDURE — 99222 1ST HOSP IP/OBS MODERATE 55: CPT | Performed by: INTERNAL MEDICINE

## 2024-05-18 RX ORDER — ACETAMINOPHEN 325 MG/1
975 TABLET ORAL ONCE
Status: COMPLETED | OUTPATIENT
Start: 2024-05-18 | End: 2024-05-18

## 2024-05-18 RX ORDER — DEXAMETHASONE SODIUM PHOSPHATE 10 MG/ML
10 INJECTION, SOLUTION INTRAMUSCULAR; INTRAVENOUS ONCE
Status: COMPLETED | OUTPATIENT
Start: 2024-05-18 | End: 2024-05-18

## 2024-05-18 RX ORDER — PANTOPRAZOLE SODIUM 40 MG/1
40 TABLET, DELAYED RELEASE ORAL
Status: DISCONTINUED | OUTPATIENT
Start: 2024-05-18 | End: 2024-05-18 | Stop reason: HOSPADM

## 2024-05-18 RX ORDER — BUPROPION HYDROCHLORIDE 150 MG/1
300 TABLET ORAL DAILY
Status: DISCONTINUED | OUTPATIENT
Start: 2024-05-18 | End: 2024-05-18 | Stop reason: HOSPADM

## 2024-05-18 RX ORDER — ACETAMINOPHEN 10 MG/ML
1000 INJECTION, SOLUTION INTRAVENOUS ONCE
Status: COMPLETED | OUTPATIENT
Start: 2024-05-18 | End: 2024-05-18

## 2024-05-18 RX ORDER — CELECOXIB 100 MG/1
200 CAPSULE ORAL 2 TIMES DAILY
Status: DISCONTINUED | OUTPATIENT
Start: 2024-05-18 | End: 2024-05-18

## 2024-05-18 RX ORDER — ZOLPIDEM TARTRATE 5 MG/1
10 TABLET ORAL
Status: DISCONTINUED | OUTPATIENT
Start: 2024-05-18 | End: 2024-05-18 | Stop reason: HOSPADM

## 2024-05-18 RX ORDER — AZITHROMYCIN 250 MG/1
500 TABLET, FILM COATED ORAL ONCE
Status: COMPLETED | OUTPATIENT
Start: 2024-05-18 | End: 2024-05-18

## 2024-05-18 RX ORDER — ALPRAZOLAM 0.5 MG/1
0.5 TABLET ORAL DAILY PRN
Status: DISCONTINUED | OUTPATIENT
Start: 2024-05-18 | End: 2024-05-18 | Stop reason: HOSPADM

## 2024-05-18 RX ADMIN — AZITHROMYCIN MONOHYDRATE 500 MG: 250 TABLET ORAL at 06:55

## 2024-05-18 RX ADMIN — ACETAMINOPHEN 1000 MG: 10 INJECTION INTRAVENOUS at 06:02

## 2024-05-18 RX ADMIN — IOHEXOL 85 ML: 350 INJECTION, SOLUTION INTRAVENOUS at 06:50

## 2024-05-18 RX ADMIN — PANTOPRAZOLE SODIUM 40 MG: 40 TABLET, DELAYED RELEASE ORAL at 12:04

## 2024-05-18 RX ADMIN — ACETAMINOPHEN 975 MG: 325 TABLET, FILM COATED ORAL at 12:04

## 2024-05-18 RX ADMIN — DEXAMETHASONE SODIUM PHOSPHATE 10 MG: 10 INJECTION INTRAMUSCULAR; INTRAVENOUS at 05:58

## 2024-05-18 RX ADMIN — PHENOL 1 SPRAY: 1.5 LIQUID ORAL at 12:34

## 2024-05-18 RX ADMIN — SODIUM CHLORIDE 500 ML: 0.9 INJECTION, SOLUTION INTRAVENOUS at 06:16

## 2024-05-18 RX ADMIN — CEFTRIAXONE SODIUM 1000 MG: 10 INJECTION, POWDER, FOR SOLUTION INTRAVENOUS at 06:55

## 2024-05-18 NOTE — H&P
[facility]  H&P  Name: Reese Nicole 73 y.o. male I MRN: [id number]  Unit/Bed#: W -01 I Date of Admission: 5/18/2024   Date of Service: 5/18/2024 I Hospital Day: 0      Assessment & Plan   * COVID-19 virus infection  Assessment & Plan  Pt came in for sore throat, runny nose  No trouble breathing or swallowing.  Patient tolerated diet.  No fever or leukocytosis  CT soft tissue neck was unremarkable.  Patient does have pharyngitis on exam  Reported improvement in sore throat by the time I saw him.  Chest x-ray reviewed by me personally, no consolidation seen.  Official read pending.  Ordered Chloraseptic spray for sore throat.    Sore throat  Assessment & Plan  Secondary to COVID pharyngitis  Ordered Chloraseptic spray  Advised patient he can use as needed Tylenol    Hyponatremia  Assessment & Plan  Sodium 131 on admission  Patient tolerating diet.  Will recheck.    Primary insomnia  Assessment & Plan  As needed Ambien               VTE Prophylaxis: low risk - ambulate pt  Code Status: full  Discussion with family: pt    Anticipated Length of Stay:  Patient will be admitted on an Observation basis with an anticipated length of stay of  less than 2 midnights.   Justification for Hospital Stay: above      Chief Complaint:   Sore throat    History of Present Illness:    Reese Nicole is a 73 y.o. male who presents with sore throat x 2-3 days.  Said he started having some runny nose with clear nasal discharge on Sunday, 2 days later down into greenish color and more thick.  He has history of recurrent sinusitis.  He called his PCP who prescribed amoxicillin to use daily for really needs to.  He traveled to [place] recently.  2 days ago started having sore throat so he started taking amoxicillin.  Continued to have worsening sore throat so presented to ED today.  Denies any trouble swallowing, no cough or shortness of breath.  Has been checking his temperature at home no  fever.    Review of Systems:    Review of Systems   Constitutional:  Negative for chills and fever.   HENT:  Positive for congestion and sore throat. Negative for trouble swallowing and voice change.    Respiratory:  Negative for cough and shortness of breath.    Cardiovascular:  Negative for chest pain and palpitations.   Gastrointestinal:  Negative for abdominal pain, nausea and vomiting.   Genitourinary:  Negative for difficulty urinating, flank pain, frequency and urgency.   Musculoskeletal:  Negative for arthralgias and neck pain.   Skin:  Negative for color change and rash.   Neurological:  Negative for dizziness and light-headedness.   Psychiatric/Behavioral:  Negative for agitation, behavioral problems and confusion.        Past Medical and Surgical History:     Past Medical History:   Diagnosis Date    Anxiety     Arthritis 01/01/2010    Depression     GERD (gastroesophageal reflux disease)     Visual impairment     glasses       Past Surgical History:   Procedure Laterality Date    APPENDECTOMY      HERNIA REPAIR      JOINT REPLACEMENT  06/24/2022    full right knee    KNEE ARTHROSCOPY Bilateral     AR RPR 1ST INGUN HRNA AGE 5 YRS/> REDUCIBLE Left 2/7/2024    Procedure: REPAIR HERNIA INGUINAL;  Surgeon: Robert Bloch, MD;  Location: [facility] OR;  Service: General    REPLACEMENT TOTAL KNEE Right     WRIST SURGERY Left 2021    L. Wrist--removed small bone and replaced w. tendon due to arthritis       Meds/Allergies:    Prior to Admission medications    Medication Sig Start Date End Date Taking? Authorizing Provider   buPROPion (WELLBUTRIN XL) 300 mg 24 hr tablet Take 1 tablet (300 mg total) by mouth in the morning 2/14/24  Yes JESSICA Cannon, DO   celecoxib (CeleBREX) 200 mg capsule Take 1 capsule (200 mg total) by mouth 2 (two) times a day 3/5/24  Yes JESSICA Cannon, DO   omeprazole (PriLOSEC) 20 mg delayed release capsule Take 20 mg by mouth in the morning 1/1/10  Yes Historical Provider, MD   oxymetazoline  "(AFRIN) 0.05 % nasal spray 2 sprays into each nostril in the morning   Yes Historical Provider, MD   Pseudoephedrine-guaiFENesin -1200 MG TB12 Take 1 tablet by mouth in the morning   Yes Historical Provider, MD   zolpidem (AMBIEN) 10 mg tablet Take 1 tablet (10 mg total) by mouth daily at bedtime as needed for sleep 24  Yes JESSICA Cannon DO   ALPRAZolam (XANAX) 0.5 mg tablet Take 1 tablet (0.5 mg total) by mouth 2 (two) times a day Pt states taking OD PRN 23   G Larry Cannon DO   ketoconazole (NIZORAL) 2 % cream Apply topically daily  Patient taking differently: Apply topically daily as needed 23   JESSICA Cannon DO         Allergies: No Known Allergies    Social History:     Marital Status: Single   Occupation:   Patient Pre-hospital Living Situation:   Patient Pre-hospital Level of Mobility:   Patient Pre-hospital Diet Restrictions:   Substance Use History:   Social History     Substance and Sexual Activity   Alcohol Use Yes    Alcohol/week: 2.0 standard drinks of alcohol    Types: 2 Glasses of wine per week    Comment: 1 glass wine/3 nights     Social History     Tobacco Use   Smoking Status Never   Smokeless Tobacco Never     Social History     Substance and Sexual Activity   Drug Use Never       Family History:    Family History   Problem Relation Age of Onset    Lung disease Mother          at 89    Cancer Mother         lung cancer    Crohn's disease Father     Diabetes Sister     Heart failure Sister        Physical Exam:     Vitals:   Blood Pressure: 122/72 (24)  Pulse: 89 (24)  Temperature: 98.1 °F (36.7 °C) (24)  Temp Source: Oral (24)  Respirations: 18 (24)  Height: 5' 9.02\" (175.3 cm) (24)  Weight - Scale: 88.5 kg (195 lb 1.7 oz) (24)  SpO2: 97 % (24)    Physical Exam    Constitutional: Pt appears comfortable. Not in any acute distress.  HENT:   Head: Normocephalic and atraumatic.   Eyes: " EOM are normal.   Neck: Neck supple. Pharyngeal erythema.  Cardiovascular: Normal rate, regular rhythm, normal heart sounds.  No murmur heard.  Pulmonary/Chest: Effort normal, air entry b/l equal. No respiratory distress. Pt has no wheezes or crackles.   Abdominal: Soft. Non-distended, Non-tender. Bowel sounds are normal.   Musculoskeletal: Normal range of motion.   Neurological: awake, alert. Moving all extremities spontaneously.  Psychiatric: normal mood and affect.      Additional Data:     Lab Results: I have personally reviewed pertinent reports.      Results from last 7 days   Lab Units 05/18/24  0555   WBC Thousand/uL 6.30   HEMOGLOBIN g/dL 15.8   HEMATOCRIT % 47.7   PLATELETS Thousands/uL 223   SEGS PCT % 78*   LYMPHO PCT % 11*   MONO PCT % 10   EOS PCT % 0     Results from last 7 days   Lab Units 05/18/24  0555   SODIUM mmol/L 131*   POTASSIUM mmol/L 3.8   CHLORIDE mmol/L 99   CO2 mmol/L 22   BUN mg/dL 14   CREATININE mg/dL 0.90   ANION GAP mmol/L 10   CALCIUM mg/dL 8.8   ALBUMIN g/dL 4.2   TOTAL BILIRUBIN mg/dL 0.37   ALK PHOS U/L 79   ALT U/L 19   AST U/L 18   GLUCOSE RANDOM mg/dL 136     Results from last 7 days   Lab Units 05/18/24  0555   INR  1.02             Results from last 7 days   Lab Units 05/18/24  0555   LACTIC ACID mmol/L 1.2   PROCALCITONIN ng/ml <0.05       Imaging: I have personally reviewed pertinent reports.      CT head without contrast   ED Interpretation by Elmer Moya MD (05/18 0720)   FINDINGS:     PARENCHYMA: No acute infarct, hemorrhage, mass, shift or herniation. Diminished attenuation in the periventricular and subcortical due to mild chronic microangiopathy. Tortuous ectatic cavernous left ICA as seen on concurrent contrast-enhanced neck CT     VENTRICLES AND EXTRA-AXIAL SPACES: Ventricles are age appropriate.     VISUALIZED ORBITS: Normal visualized orbits.     PARANASAL SINUSES: Mild right maxillary sinus mucosal thickening. Paranasal sinuses and mastoid air cells are  otherwise aerated.     CALVARIUM AND EXTRACRANIAL SOFT TISSUES:  Normal.     IMPRESSION:     No acute intracranial pathology.                 Workstation performed: [id number]        Final Result by E. Alec Schoenberger, MD (05/18 0715)      No acute intracranial pathology.                  Workstation performed: [id number]         CT soft tissue neck w contrast   ED Interpretation by Elmer Moya MD (05/18 0721)   FINDINGS:     VISUALIZED BRAIN PARENCHYMA:  No acute intracranial pathology of the visualized brain parenchyma. Tortuous ectatic cavernous left ICA.     VISUALIZED ORBITS: Normal visualized orbits.     PARANASAL SINUSES: Mild right maxillary sinus mucosal thickening. Paranasal sinuses and mastoid air cells are otherwise aerated.     NASAL CAVITY AND NASOPHARYNX:  Normal.     SUPRAHYOID NECK:  Normal oral cavity, tongue base, tonsillar fossa and epiglottis.     INFRAHYOID NECK:  Aryepiglottic folds and piriform sinuses are normal.  Normal glottis and subglottic airway.     THYROID GLAND:  Unremarkable.     PAROTID AND SUBMANDIBULAR GLANDS:  Normal.     LYMPH NODES:  No pathologic or enlarged adenopathy.     VASCULAR STRUCTURES:  Normal enhancement of the cervical vasculature.     THORACIC INLET: Moderate apical scarring. Emphysematous changes and/or thin-walled air-filled cyst     BONY STRUCTURES: No acute osseous abnormality. Partial osseous fusion at C2-3 and C3-4. Spinal degenerativ   e changes     IMPRESSION:     No mass, adenopathy or acute inflammatory process in the neck.           Workstation performed: [id number]        Final Result by E. Alec Schoenberger, MD (05/18 0715)      No mass, adenopathy or acute inflammatory process in the neck.            Workstation performed: [id number]         XR chest 1 view portable   ED Interpretation by Elmer Moya MD (05/18 0674)   Diffuse bilateral infiltrates read by me.           EKG, Pathology, and Other Studies Reviewed on Admission:   EKG:      Allscripts / Epic Records Reviewed: Yes     ** Please Note: This note has been constructed using a voice recognition system. **

## 2024-05-18 NOTE — ASSESSMENT & PLAN NOTE
Pt came in for sore throat, runny nose  No trouble breathing or swallowing.  Patient tolerated diet.  No fever or leukocytosis  CT soft tissue neck was unremarkable.  Patient does have pharyngitis on exam  Reported improvement in sore throat by the time I saw him.  Chest x-ray reviewed by me personally, no consolidation seen.  Official read pending.  Ordered Chloraseptic spray for sore throat.

## 2024-05-18 NOTE — ASSESSMENT & PLAN NOTE
Secondary to COVID pharyngitis  Ordered Chloraseptic spray  Advised patient he can use as needed Tylenol

## 2024-05-18 NOTE — PLAN OF CARE
Problem: PAIN - ADULT  Goal: Verbalizes/displays adequate comfort level or baseline comfort level  Description: Interventions:  - Encourage patient to monitor pain and request assistance  - Assess pain using appropriate pain scale  - Administer analgesics based on type and severity of pain and evaluate response  - Implement non-pharmacological measures as appropriate and evaluate response  - Consider cultural and social influences on pain and pain management  - Notify physician/advanced practitioner if interventions unsuccessful or patient reports new pain  Outcome: Progressing     Problem: INFECTION - ADULT  Goal: Absence or prevention of progression during hospitalization  Description: INTERVENTIONS:  - Assess and monitor for signs and symptoms of infection  - Monitor lab/diagnostic results  - Monitor all insertion sites, i.e. indwelling lines, tubes, and drains  - Monitor endotracheal if appropriate and nasal secretions for changes in amount and color  - [place] appropriate cooling/warming therapies per order  - Administer medications as ordered  - Instruct and encourage patient and family to use good hand hygiene technique  - Identify and instruct in appropriate isolation precautions for identified infection/condition  Outcome: Progressing  Goal: Absence of fever/infection during neutropenic period  Description: INTERVENTIONS:  - Monitor WBC    Outcome: Progressing     Problem: SAFETY ADULT  Goal: Patient will remain free of falls  Description: INTERVENTIONS:  - Educate patient/family on patient safety including physical limitations  - Instruct patient to call for assistance with activity   - Consult OT/PT to assist with strengthening/mobility   - Keep Call bell within reach  - Keep bed low and locked with side rails adjusted as appropriate  - Keep care items and personal belongings within reach  - Initiate and maintain comfort rounds  - Make Fall Risk Sign visible to staff  - Offer Toileting every  Hours,  in advance of need  - Initiate/Maintain alarm  - Obtain necessary fall risk management equipment:   - Apply yellow socks and bracelet for high fall risk patients  - Consider moving patient to room near nurses station  Outcome: Progressing  Goal: Maintain or return to baseline ADL function  Description: INTERVENTIONS:  -  Assess patient's ability to carry out ADLs; assess patient's baseline for ADL function and identify physical deficits which impact ability to perform ADLs (bathing, care of mouth/teeth, toileting, grooming, dressing, etc.)  - Assess/evaluate cause of self-care deficits   - Assess range of motion  - Assess patient's mobility; develop plan if impaired  - Assess patient's need for assistive devices and provide as appropriate  - Encourage maximum independence but intervene and supervise when necessary  - Involve family in performance of ADLs  - Assess for home care needs following discharge   - Consider OT consult to assist with ADL evaluation and planning for discharge  - Provide patient education as appropriate  Outcome: Progressing  Goal: Maintains/Returns to pre admission functional level  Description: INTERVENTIONS:  - Perform AM-PAC 6 Click Basic Mobility/ Daily Activity assessment daily.  - Set and communicate daily mobility goal to care team and patient/family/caregiver.   - Collaborate with rehabilitation services on mobility goals if consulted  - Perform Range of Motion  times a day.  - Reposition patient every  hours.  - Dangle patient  times a day  - Stand patient  times a day  - Ambulate patient  times a day  - Out of bed to chair  times a day   - Out of bed for meals  times a day  - Out of bed for toileting  - Record patient progress and toleration of activity level   Outcome: Progressing     Problem: DISCHARGE PLANNING  Goal: Discharge to home or other facility with appropriate resources  Description: INTERVENTIONS:  - Identify barriers to discharge w/patient and caregiver  - Arrange for  needed discharge resources and transportation as appropriate  - Identify discharge learning needs (meds, wound care, etc.)  - Arrange for interpretive services to assist at discharge as needed  - Refer to Case Management Department for coordinating discharge planning if the patient needs post-hospital services based on physician/advanced practitioner order or complex needs related to functional status, cognitive ability, or social support system  Outcome: Progressing

## 2024-05-18 NOTE — DISCHARGE INSTR - AVS FIRST PAGE
Dear Reese Nicole,     It was our pleasure to care for you here at [facility].  It is our hope that we were always able to exceed the expected standards for your care during your stay.  You were hospitalized due to ***.  You were cared for on the *** floor under the service of Marco Low MD with the [facility] who covers for your primary care physician (PCP), JESSICA Cannon DO, while you were hospitalized.  If you have any questions or concerns related to this hospitalization, you may contact us at .  For follow up as well as medication refills, we recommend that you follow up with your primary care physician.  A registered nurse will reach out to you by phone within a few days after your discharge to answer any additional questions that you may have after going home.  However, at this time we provide for you here, the most important instructions / recommendations at discharge:     Notable Medication Adjustments -   None. Complete amoxicillin prescribed by your family doctor for 5 more days  Testing Required after Discharge -   BMP in 1 week  ** Please contact your PCP to request testing orders for any of the testing recommended here **  Please review this entire after visit summary as additional general instructions including medication list, appointments, activity, diet, any pertinent wound care, and other additional recommendations from your care team that may be provided for you.      Sincerely,     Marco Low MD

## 2024-05-18 NOTE — DISCHARGE SUMMARY
Discharging Physician / Practitioner: Marco Low MD  PCP: JESSICA Cannon DO  Admission Date:   Admission Orders (From admission, onward)       Ordered        05/18/24 0729  Place in Observation  Once                          Discharge Date: 05/18/24    Medical Problems       Resolved Problems  Date Reviewed: 5/18/2024   None         S  Reason for Admission: sore throat    Hospital Course:     Reese Nicole is a 73 y.o. male patient who originally presented to the hospital on 5/18/2024 due to sore throat.  Noted to be COVID-positive.  Otherwise completely stable.  Was mildly hyponatremic at 131.  Improved to 132 on rechecking.  Tolerating diet well.  No shortness of breath.  Saturating well on room air.  No other systemic signs or symptoms of infection.    Medically stable for discharge.  Advised patient to use as needed Tylenol/Motrin/Chloraseptic spray for sore throat.  Patient was prescribed amoxicillin by his PCP because of his history of recurrent sinusitis.  Advised him to complete for 5 more days.  Outpatient follow-up with PCP in 1 week with repeat BMP in 1 week.    Please see above list of diagnoses and related plan for additional information.     Condition at Discharge: good     Discharge Day Visit / Exam:     * Please refer to separate progress note for these details *    Discussion with Family: pt    Discharge instructions/Information to patient and family:   See after visit summary for information provided to patient and family.      Provisions for Follow-Up Care:  See after visit summary for information related to follow-up care and any pertinent home health orders.      Disposition:     Home    For Discharges to [facility]:   Not Applicable to this Patient - Not Applicable to this Patient    Planned Readmission: no     Discharge Statement:  I spent 20 minutes discharging the patient. This time was spent on the day of discharge. I had direct contact with the patient on the day  of discharge. Greater than 50% of the total time was spent examining patient, answering all patient questions, arranging and discussing plan of care with patient as well as directly providing post-discharge instructions.  Additional time then spent on discharge activities.    Discharge Medications:  See after visit summary for reconciled discharge medications provided to patient and family.      ** Please Note: This note has been constructed using a voice recognition system **

## 2024-05-18 NOTE — SEPSIS NOTE
Sepsis Note   Reese Nicole 73 y.o. male MRN: [id number]  Unit/Bed#: ED-32 Encounter: [id number]       Initial Sepsis Screening       Row Name 05/18/24 0639                Is the patient's history suggestive of a new or worsening infection? Yes (Proceed)  -AO        Suspected source of infection pneumonia  pharyngitis, sinusitis  -AO        Indicate SIRS criteria Tachycardia > 90 bpm  -AO        Are two or more of the above signs & symptoms of infection both present and new to the patient? No  -AO                  User Key  (r) = Recorded By, (t) = Taken By, (c) = Cosigned By      Initials Name Provider Type    AO Elmer Moya MD Physician                        There is no height or weight on file to calculate BMI.  Wt Readings from Last 1 Encounters:   03/13/24 88.5 kg (195 lb 3.2 oz)        Ideal body weight: 70.7 kg (155 lb 13.8 oz)  Adjusted ideal body weight: 77.8 kg (171 lb 9.6 oz)

## 2024-05-18 NOTE — ED PROVIDER NOTES
History  Chief Complaint   Patient presents with    Sore Throat     Pt c/o of worsening sore throat that started yesterday. +difficulty swallowing. Pt states he came back from [place] 3 days ago, had a runny nose with green drainage, thought it was the beginning a sinus infection and medicated with amoxicillin that he had on hand. -chills/fevers/SOB. Pt medicated with an oxycodone at midnight with no relief      Patient is a 73 year old male with several days of cough, nasal discharge which is green, sore throat, difficulty swallowing. Just got back from [place] recently. No known ill contacts. No fever. No N/V/D. No urinary sx. Was last seen at  [facility] Care in [place] on 3/13/24 for acute recurrent pansinusitis. Started taking amoxicillin the other day. Took oxycodone for pain tonight as well. No sob. [facility] website checked on this patient and last Rx filled was on 4/25/24 for ambien for 30 day supply.       History provided by:  Patient   used: No    Sore Throat  Associated symptoms: cough, rhinorrhea and trouble swallowing    Associated symptoms: no fever and no shortness of breath        Prior to Admission Medications   Prescriptions Last Dose Informant Patient Reported? Taking?   ALPRAZolam (XANAX) 0.5 mg tablet  Self No No   Sig: Take 1 tablet (0.5 mg total) by mouth 2 (two) times a day Pt states taking OD PRN   Pseudoephedrine-guaiFENesin -1200 MG TB12  Self Yes No   Sig: Take 1 tablet by mouth in the morning   buPROPion (WELLBUTRIN XL) 300 mg 24 hr tablet  Self No No   Sig: Take 1 tablet (300 mg total) by mouth in the morning   celecoxib (CeleBREX) 200 mg capsule  Self No No   Sig: Take 1 capsule (200 mg total) by mouth 2 (two) times a day   ketoconazole (NIZORAL) 2 % cream  Self No No   Sig: Apply topically daily   Patient taking differently: Apply topically daily as needed   omeprazole (PriLOSEC) 20 mg delayed release capsule  Self Yes No   Sig: Take 20 mg by mouth in the  morning   oxymetazoline (AFRIN) 0.05 % nasal spray  Self Yes No   Si sprays into each nostril in the morning   zolpidem (AMBIEN) 10 mg tablet   No No   Sig: Take 1 tablet (10 mg total) by mouth daily at bedtime as needed for sleep      Facility-Administered Medications: None       Past Medical History:   Diagnosis Date    Anxiety     Arthritis 2010    Depression     GERD (gastroesophageal reflux disease)     Visual impairment     glasses       Past Surgical History:   Procedure Laterality Date    APPENDECTOMY      HERNIA REPAIR      JOINT REPLACEMENT  2022    full right knee    KNEE ARTHROSCOPY Bilateral     CO RPR 1ST INGUN HRNA AGE 5 YRS/> REDUCIBLE Left 2024    Procedure: REPAIR HERNIA INGUINAL;  Surgeon: Robert Bloch, MD;  Location:  MAIN OR;  Service: General    REPLACEMENT TOTAL KNEE Right     WRIST SURGERY Left     L. Wrist--removed small bone and replaced w. tendon due to arthritis       Family History   Problem Relation Age of Onset    Lung disease Mother          at 89    Cancer Mother         lung cancer    Crohn's disease Father     Diabetes Sister     Heart failure Sister      I have reviewed and agree with the history as documented.    E-Cigarette/Vaping    E-Cigarette Use Never User      E-Cigarette/Vaping Substances     Social History     Tobacco Use    Smoking status: Never    Smokeless tobacco: Never   Vaping Use    Vaping status: Never Used   Substance Use Topics    Alcohol use: Yes     Alcohol/week: 2.0 standard drinks of alcohol     Types: 2 Glasses of wine per week     Comment: 1 glass wine/3 nights    Drug use: Never       Review of Systems   Constitutional:  Negative for fever.   HENT:  Positive for rhinorrhea, sinus pressure, sore throat and trouble swallowing.    Respiratory:  Positive for cough. Negative for shortness of breath.    Gastrointestinal:  Negative for diarrhea, nausea and vomiting.   Genitourinary:  Negative for difficulty urinating.   All other  systems reviewed and are negative.      Physical Exam  Physical Exam  Vitals and nursing note reviewed.   Constitutional:       General: He is in acute distress (mild).   HENT:      Head: Normocephalic and atraumatic.      Right Ear: Tympanic membrane, ear canal and external ear normal.      Left Ear: Tympanic membrane, ear canal and external ear normal.      Mouth/Throat:      Mouth: Mucous membranes are moist.      Pharynx: Oropharyngeal exudate and posterior oropharyngeal erythema present.   Eyes:      General: No scleral icterus.     Extraocular Movements: Extraocular movements intact.      Pupils: Pupils are equal, round, and reactive to light.   Cardiovascular:      Rate and Rhythm: Normal rate and regular rhythm.      Heart sounds: Normal heart sounds. No murmur heard.  Pulmonary:      Effort: Pulmonary effort is normal. No respiratory distress.      Breath sounds: Normal breath sounds. No stridor. No wheezing, rhonchi or rales.   Abdominal:      General: Bowel sounds are normal.      Palpations: Abdomen is soft.      Tenderness: There is no abdominal tenderness.   Musculoskeletal:         General: No deformity.      Cervical back: Normal range of motion and neck supple. No rigidity or tenderness.      Right lower leg: No edema.      Left lower leg: No edema.   Lymphadenopathy:      Cervical: Cervical adenopathy (shotty anterior) present.   Skin:     General: Skin is warm and dry.      Findings: No erythema or rash.   Neurological:      General: No focal deficit present.      Mental Status: He is alert and oriented to person, place, and time.   Psychiatric:         Mood and Affect: Mood normal.         Vital Signs  ED Triage Vitals [05/18/24 0540]   Temperature Pulse Respirations Blood Pressure SpO2   98.9 °F (37.2 °C) 95 20 153/89 95 %      Temp Source Heart Rate Source Patient Position - Orthostatic VS BP Location FiO2 (%)   Oral Monitor Sitting Right arm --      Pain Score       --           Vitals:     05/18/24 0540   BP: 153/89   Pulse: 95   Patient Position - Orthostatic VS: Sitting         Visual Acuity      ED Medications  Medications   sodium chloride 0.9 % bolus 500 mL (0 mL Intravenous Stopped 5/18/24 0716)   acetaminophen (Ofirmev) injection 1,000 mg (0 mg Intravenous Stopped 5/18/24 0642)   dexamethasone (PF) (DECADRON) injection 10 mg (10 mg Intravenous Given 5/18/24 0558)   ceftriaxone (ROCEPHIN) 1 g/50 mL in dextrose IVPB (0 mg Intravenous Stopped 5/18/24 0725)   azithromycin (ZITHROMAX) tablet 500 mg (500 mg Oral Given 5/18/24 0655)   iohexol (OMNIPAQUE) 350 MG/ML injection (MULTI-DOSE) 85 mL (85 mL Intravenous Given 5/18/24 0650)       Diagnostic Studies  Results Reviewed       Procedure Component Value Units Date/Time    Procalcitonin [[id number]] Collected: 05/18/24 0555    Lab Status: In process Specimen: Blood from Arm, Left Updated: 05/18/24 0715    FLU/RSV/COVID - if FLU/RSV clinically relevant [[id number]]  (Abnormal) Collected: 05/18/24 0555    Lab Status: Final result Specimen: Nares from Nose Updated: 05/18/24 0705     SARS-CoV-2 Positive     INFLUENZA A PCR Negative     INFLUENZA B PCR Negative     RSV PCR Negative    Narrative:      FOR PEDIATRIC PATIENTS - copy/paste COVID Guidelines URL to browser: [email or web address]    SARS-CoV-2 assay is a Nucleic Acid Amplification assay intended for the  qualitative detection of nucleic acid from SARS-CoV-2 in nasopharyngeal  swabs. Results are for the presumptive identification of SARS-CoV-2 RNA.    Positive results are indicative of infection with SARS-CoV-2, the virus  causing COVID-19, but do not rule out bacterial infection or co-infection  with other viruses. Laboratories within the [place] and its  territories are required to report all positive results to the appropriate  public health authorities. Negative results do not preclude SARS-CoV-2  infection and should not be used as the  sole basis for treatment or other  patient management decisions. Negative results must be combined with  clinical observations, patient history, and epidemiological information.  This test has not been FDA cleared or approved.    This test has been authorized by FDA under an Emergency Use Authorization  (EUA). This test is only authorized for the duration of time the  declaration that circumstances exist justifying the authorization of the  emergency use of an in vitro diagnostic tests for detection of SARS-CoV-2  virus and/or diagnosis of COVID-19 infection under section 564(b)(1) of  the Act, 21 U.S.C. 360bbb-3(b)(1), unless the authorization is terminated  or revoked sooner. The test has been validated but independent review by FDA  and CLIA is pending.    Test performed using [employer]: This RT-PCR assay targets N2,  a region unique to SARS-CoV-2. A conserved region in the E-gene was chosen  for pan-Sarbecovirus detection which includes SARS-CoV-2.    According to CMS-2020-01-R, this platform meets the definition of high-throughput technology.    Strep A PCR [[id number]]  (Normal) Collected: 05/18/24 0555    Lab Status: Final result Specimen: Throat Updated: 05/18/24 0654     STREP A PCR Not Detected    Comprehensive metabolic panel [[id number]]  (Abnormal) Collected: 05/18/24 0555    Lab Status: Final result Specimen: Blood from Arm, Left Updated: 05/18/24 0640     Sodium 131 mmol/L      Potassium 3.8 mmol/L      Chloride 99 mmol/L      CO2 22 mmol/L      ANION GAP 10 mmol/L      BUN 14 mg/dL      Creatinine 0.90 mg/dL      Glucose 136 mg/dL      Calcium 8.8 mg/dL      AST 18 U/L      ALT 19 U/L      Alkaline Phosphatase 79 U/L      Total Protein 7.9 g/dL      Albumin 4.2 g/dL      Total Bilirubin 0.37 mg/dL      eGFR 84 ml/min/1.73sq m     Narrative:      National Kidney Disease Foundation guidelines for Chronic Kidney Disease (CKD):     Stage 1 with normal or high GFR (GFR > 90 mL/min/1.73 square  meters)    Stage 2 Mild CKD (GFR = 60-89 mL/min/1.73 square meters)    Stage 3A Moderate CKD (GFR = 45-59 mL/min/1.73 square meters)    Stage 3B Moderate CKD (GFR = 30-44 mL/min/1.73 square meters)    Stage 4 Severe CKD (GFR = 15-29 mL/min/1.73 square meters)    Stage 5 End Stage CKD (GFR <15 mL/min/1.73 square meters)  Note: GFR calculation is accurate only with a steady state creatinine    Lactic acid, plasma (w/reflex if result > 2.0) [[id number]]  (Normal) Collected: 05/18/24 0555    Lab Status: Final result Specimen: Blood from Arm, Left Updated: 05/18/24 0637     LACTIC ACID 1.2 mmol/L     Narrative:      Result may be elevated if tourniquet was used during collection.    Protime-INR [[id number]]  (Normal) Collected: 05/18/24 0555    Lab Status: Final result Specimen: Blood from Arm, Left Updated: 05/18/24 0631     Protime 14.0 seconds      INR 1.02    APTT [[id number]]  (Abnormal) Collected: 05/18/24 0555    Lab Status: Final result Specimen: Blood from Arm, Left Updated: 05/18/24 0631     PTT 39 seconds     Mononucleosis screen [[id number]] Collected: 05/18/24 0614    Lab Status: In process Specimen: Blood from Arm, Left Updated: 05/18/24 0619    CBC and differential [[id number]]  (Abnormal) Collected: 05/18/24 0555    Lab Status: Final result Specimen: Blood from Arm, Left Updated: 05/18/24 0611     WBC 6.30 Thousand/uL      RBC 5.62 Million/uL      Hemoglobin 15.8 g/dL      Hematocrit 47.7 %      MCV 85 fL      MCH 28.1 pg      MCHC 33.1 g/dL      RDW 14.0 %      MPV 9.9 fL      Platelets 223 Thousands/uL      nRBC 0 /100 WBCs      Segmented % 78 %      Immature Grans % 0 %      Lymphocytes % 11 %      Monocytes % 10 %      Eosinophils Relative 0 %      Basophils Relative 1 %      Absolute Neutrophils 4.95 Thousands/µL      Absolute Immature Grans 0.02 Thousand/uL      Absolute Lymphocytes 0.67 Thousands/µL      Absolute Monocytes 0.63 Thousand/µL      Eosinophils Absolute 0.00 Thousand/µL      Basophils  Absolute 0.03 Thousands/µL     Blood culture #1 [[id number]] Collected: 05/18/24 0555    Lab Status: In process Specimen: Blood from Arm, Left Updated: 05/18/24 0607    Blood culture #2 [[id number]] Collected: 05/18/24 0555    Lab Status: In process Specimen: Blood from Arm, Left Updated: 05/18/24 0607                   CT head without contrast   ED Interpretation by Elmer Moya MD (05/18 0720)   FINDINGS:     PARENCHYMA: No acute infarct, hemorrhage, mass, shift or herniation. Diminished attenuation in the periventricular and subcortical due to mild chronic microangiopathy. Tortuous ectatic cavernous left ICA as seen on concurrent contrast-enhanced neck CT     VENTRICLES AND EXTRA-AXIAL SPACES: Ventricles are age appropriate.     VISUALIZED ORBITS: Normal visualized orbits.     PARANASAL SINUSES: Mild right maxillary sinus mucosal thickening. Paranasal sinuses and mastoid air cells are otherwise aerated.     CALVARIUM AND EXTRACRANIAL SOFT TISSUES:  Normal.     IMPRESSION:     No acute intracranial pathology.                 Workstation performed: [id number]        Final Result by E. Alec Schoenberger, MD (05/18 0715)      No acute intracranial pathology.                  Workstation performed: [id number]         CT soft tissue neck w contrast   ED Interpretation by Elmer Moya MD (05/18 0721)   FINDINGS:     VISUALIZED BRAIN PARENCHYMA:  No acute intracranial pathology of the visualized brain parenchyma. Tortuous ectatic cavernous left ICA.     VISUALIZED ORBITS: Normal visualized orbits.     PARANASAL SINUSES: Mild right maxillary sinus mucosal thickening. Paranasal sinuses and mastoid air cells are otherwise aerated.     NASAL CAVITY AND NASOPHARYNX:  Normal.     SUPRAHYOID NECK:  Normal oral cavity, tongue base, tonsillar fossa and epiglottis.     INFRAHYOID NECK:  Aryepiglottic folds and piriform sinuses are normal.  Normal glottis and subglottic airway.     THYROID GLAND:  Unremarkable.      PAROTID AND SUBMANDIBULAR GLANDS:  Normal.     LYMPH NODES:  No pathologic or enlarged adenopathy.     VASCULAR STRUCTURES:  Normal enhancement of the cervical vasculature.     THORACIC INLET: Moderate apical scarring. Emphysematous changes and/or thin-walled air-filled cyst     BONY STRUCTURES: No acute osseous abnormality. Partial osseous fusion at C2-3 and C3-4. Spinal degenerativ   e changes     IMPRESSION:     No mass, adenopathy or acute inflammatory process in the neck.           Workstation performed: [id number]        Final Result by E. Alec Schoenberger, MD (05/18 0715)      No mass, adenopathy or acute inflammatory process in the neck.            Workstation performed: [id number]         XR chest 1 view portable   ED Interpretation by Elmer Moya MD (05/18 0641)   Diffuse bilateral infiltrates read by me.                  Procedures  Procedures         ED Course  ED Course as of 05/18/24 0729   Sat May 18, 2024   0653 Labs and CXR d/w patient.    0711 SARS-COV-2(!): Positive  D/w patient.   0714 Strep A PCR  D/w patient.                             Initial Sepsis Screening       Row Name 05/18/24 0639                Is the patient's history suggestive of a new or worsening infection? Yes (Proceed)  -AO        Suspected source of infection pneumonia  pharyngitis, sinusitis  -AO        Indicate SIRS criteria Tachycardia > 90 bpm  -AO        Are two or more of the above signs & symptoms of infection both present and new to the patient? No  -AO                  User Key  (r) = Recorded By, (t) = Taken By, (c) = Cosigned By      Initials Name Provider Type    AO Elmer Moya MD Physician                                  Medical Decision Making  DDx including but not limited to: pharyngitis, mononucleosis, viral illness, sinusitis, pneumonia, covid, influenza, RSV, bronchitis, URI; doubt epiglottitis, peritonsillar abscess, retropharyngeal abscess, meningitis, UTI, cellulitis.     Amount and/or  Complexity of Data Reviewed  Labs: ordered. Decision-making details documented in ED Course.  Radiology: ordered and independent interpretation performed. Decision-making details documented in ED Course.    Risk  Prescription drug management.  Decision regarding hospitalization.             Disposition  Final diagnoses:   Pharyngitis   Hyponatremia   Pneumonia due to COVID-19 virus   Sinusitis     Time reflects when diagnosis was documented in both MDM as applicable and the Disposition within this note       Time User Action Codes Description Comment    5/18/2024  6:44 AM Elmer Moya Add [J18.9] Pneumonia     5/18/2024  6:44 AM Elmer Moya S Add [J02.9] Pharyngitis     5/18/2024  6:44 AM Elmer Moya S Add [E87.1] Hyponatremia     5/18/2024  7:07 AM Elmer Moya S Add [U07.1,  J12.82] Pneumonia due to COVID-19 virus     5/18/2024  7:07 AM NitaElmer isfuentes S Modify [J18.9] Pneumonia     5/18/2024  7:07 AM Elmer Moya Modify [U07.1,  J12.82] Pneumonia due to COVID-19 virus     5/18/2024  7:07 AM Elmer Moya S Remove [J18.9] Pneumonia     5/18/2024  7:21 AM Elmer Moya Add [J32.9] Sinusitis           ED Disposition       ED Disposition   Admit    Condition   Stable    Date/Time   Sat May 18, 2024  7:28 AM    Comment   Case was discussed with Aide KAY and the patient's admission status was agreed to be Admission Status: observation status to the service of Dr. Monteiro .               Follow-up Information    None         Patient's Medications   Discharge Prescriptions    No medications on file       No discharge procedures on file.    PDMP Review         Value Time User    PDMP Reviewed  Yes 5/18/2024  5:30 AM Elmer Moya MD            ED Provider  Electronically Signed by             Elmer Moya MD  05/18/24 0729

## 2024-05-21 ENCOUNTER — RA CDI HCC (OUTPATIENT)
Dept: OTHER | Facility: HOSPITAL | Age: 74
End: 2024-05-21

## 2024-05-21 PROBLEM — Z71.2 ENCOUNTER TO DISCUSS TEST RESULTS: Status: RESOLVED | Noted: 2024-01-02 | Resolved: 2024-05-21

## 2024-05-21 PROBLEM — Z09 HOSPITAL DISCHARGE FOLLOW-UP: Status: RESOLVED | Noted: 2024-01-02 | Resolved: 2024-05-21

## 2024-05-21 PROBLEM — Z23 NEED FOR VACCINATION: Status: RESOLVED | Noted: 2024-03-13 | Resolved: 2024-05-21

## 2024-05-23 LAB
BACTERIA BLD CULT: NORMAL
BACTERIA BLD CULT: NORMAL

## 2024-05-28 ENCOUNTER — OFFICE VISIT (OUTPATIENT)
Dept: FAMILY MEDICINE CLINIC | Facility: CLINIC | Age: 74
End: 2024-05-28
Payer: MEDICARE

## 2024-05-28 VITALS
WEIGHT: 194.6 LBS | TEMPERATURE: 98.6 F | HEART RATE: 82 BPM | DIASTOLIC BLOOD PRESSURE: 70 MMHG | BODY MASS INDEX: 28.82 KG/M2 | OXYGEN SATURATION: 97 % | HEIGHT: 69 IN | SYSTOLIC BLOOD PRESSURE: 124 MMHG

## 2024-05-28 DIAGNOSIS — U07.1 COVID-19: ICD-10-CM

## 2024-05-28 DIAGNOSIS — Z79.899 MEDICATION MANAGEMENT: ICD-10-CM

## 2024-05-28 DIAGNOSIS — F32.9 REACTIVE DEPRESSION: ICD-10-CM

## 2024-05-28 DIAGNOSIS — Z09 HOSPITAL DISCHARGE FOLLOW-UP: Primary | ICD-10-CM

## 2024-05-28 DIAGNOSIS — J02.9 SORE THROAT: ICD-10-CM

## 2024-05-28 PROCEDURE — G2211 COMPLEX E/M VISIT ADD ON: HCPCS | Performed by: FAMILY MEDICINE

## 2024-05-28 PROCEDURE — 99214 OFFICE O/P EST MOD 30 MIN: CPT | Performed by: FAMILY MEDICINE

## 2024-05-28 NOTE — PROGRESS NOTES
Ambulatory Visit  Name: Reese Nicole      : 1950      MRN: [id number]  Encounter Provider: JESSICA Cannon DO  Encounter Date: 2024   Encounter department: [facility] PRIMARY [facility]    Assessment & Plan  1. Pharyngitis.  The patient's symptoms are currently at the conclusion of a COVID-19 infection. The patient is advised to continue lukewarm salt water gargles for 3 to 4 minutes every 2 hours or as often as feasible. In the event of symptom relief, the patient is advised to soak a towel in hot water, ring it out, and wrapping around his neck. If unavailable, Aleve, Motrin, or Advil can be taken temporarily, but not concurrently with Celebrex.    2. Knee pain.  The patient is advised to continue with his exercise regimen.    3. Depression.  The patient's depressive symptoms may be exacerbated by his recent separation from his niece, , and son. The patient is encouraged to establish relationships in this area. I will liaise with the lady I previously sent.    Follow-up  The patient is scheduled for a follow-up visit in 3 to 4 months.  No orders of the defined types were placed in this encounter.         History of Present Illness     History of Present Illness  The patient is a 72-year-old male who presents for evaluation of multiple medical concerns.    The patient reports experiencing a severe sore throat, which he describes as akin to a knife being lodged in the throat during swallowing. This pain intensified, leading to increased congestion and severe throat discomfort. Despite adhering to the prescribed antibiotic regimen, he experienced severe sleep disturbances, necessitating a visit to the emergency room on 2024. Upon examination of his throat, it was observed that his throat was erythematous. A chest x-ray, COVID-19 swab, and a CT scan of his head were performed, revealing a mild case of pneumonia and COVID-19. His sodium levels were noted to be significantly low,  prompting a subsequent hospital admission on Mother's Day. A subsequent blood test revealed a slight increase in his sodium levels, prompting his discharge. He was advised by Dr. York to complete the prescribed antibiotic course and gargle with lukewarm salt water. A strep test was also conducted, which yielded negative results. His condition improved both yesterday and today, with a reduced cough. He continues to expectorate lime green and yellow mucus. His sore throat commenced a day or two after returning home from [place] on 05/12/2024, coinciding with a cough and congestion, which subsequently escalated in severity. He commenced amoxicillin 875 mg twice daily on 05/14/2024. On 05/28/2024, his pain intensified, prompting him to seek emergency care at 4 AM on 05/18/2024 due to uncontrolled and most severe throat pain. He was advised to complete the amoxicillin course and gargle with lukewarm salt water. His condition gradually improved, but he continued to cough at home and expectorate green mucus. He is uncertain whether his cough was originating from his sinuses, the posterior aspect of his throat, or from his lungs or chest.    The patient's knee condition is satisfactory. He was able to perform squats prior to his trip to [place].    The patient has been feeling slightly down. He is  from his niece, , and son. He has been  to his first wife for 13 years. He has been retired for 15 years.   He has received 2 COVID-19 vaccines, but has not received any boosters.     Review of Systems   HENT:  Positive for postnasal drip, sore throat (worst ST of his life;then green mucous from chest or PND?? Slowly better, but ok now.), trouble swallowing and voice change.         Returned home on Mother's Day May 12 after 2 weeks in [place] visiting friends upon home was coughing and congestion and then sore throat became severe and started the antibiotic I gave him--amoxicillin 875 mg twice daily on  "May 14.  Today is May 28.  Pain got so bad that on 518 at 4 AM he went to the emergency room because the pain in his throat was uncontrolled and most severe.  He was told to finish the amoxicillin and gargle with lukewarm salt water and was tested for strep which was negative and a COVID which was positive   Respiratory:  Positive for cough.      Objective     /70 (BP Location: Left arm, Patient Position: Sitting, Cuff Size: Standard)   Pulse 82   Temp 98.6 °F (37 °C) (Temporal)   Ht 5' 9.02\" (1.753 m)   Wt 88.3 kg (194 lb 9.6 oz)   SpO2 97%   BMI 28.72 kg/m²     Physical Exam    Physical Exam  Constitutional:       General: He is not in acute distress.     Appearance: Normal appearance. He is not ill-appearing or diaphoretic.   HENT:      Head: Normocephalic.      Nose: No congestion or rhinorrhea.   Cardiovascular:      Rate and Rhythm: Normal rate and regular rhythm.   Pulmonary:      Effort: Pulmonary effort is normal.      Breath sounds: Normal breath sounds.   Musculoskeletal:      Right lower leg: No edema.      Left lower leg: No edema.   Neurological:      General: No focal deficit present.      Mental Status: He is alert and oriented to person, place, and time. Mental status is at baseline.   Psychiatric:         Mood and Affect: Mood normal.         Behavior: Behavior normal.         Thought Content: Thought content normal.         Judgment: Judgment normal.       Administrative Statements   I have spent a total time of 36 minutes on 05/28/24 In caring for this patient including .    I personally reviewed the recent (and prior)  lab results, the image studies, pathology, other specialty/physicians consult notes and recommendations, and outside medical records from other institutions, as appropriate. I had a lengthy discussion with the patient and shared the work-up findings. We discussed the diagnosis and management plan.  I spent appropriate time with the medical record and patient,  " commensurate with the level of service reviewing the records (labs, clinician notes, outside records, medical history, ordering medicine/tests/procedures, interpreting the imaging/labs previously done) and coordination of care as well as direct time with the patient today, of which greater than 50% of the time was spent in counseling and coordination of care with the patient/family.  Note, considerable time spent discussing psychosocial implications of his life stressors, where he is living in the recent divorce of his niece who has moved from 15 minutes away from him to almost 1 hour away from him near room 100.  He has no other family other than this.  We talked about his past life his first marriage of 13 years and his second marriage of 35 years many other issues as well.  Am trying to assimilate him in 2 local social circles

## 2024-07-23 DIAGNOSIS — M47.812 OSTEOARTHRITIS OF CERVICAL SPINE, UNSPECIFIED SPINAL OSTEOARTHRITIS COMPLICATION STATUS: ICD-10-CM

## 2024-07-24 RX ORDER — CELECOXIB 200 MG/1
200 CAPSULE ORAL 2 TIMES DAILY
Qty: 180 CAPSULE | Refills: 1 | Status: SHIPPED | OUTPATIENT
Start: 2024-07-24

## 2024-08-26 DIAGNOSIS — F32.9 REACTIVE DEPRESSION: ICD-10-CM

## 2024-08-27 RX ORDER — BUPROPION HYDROCHLORIDE 300 MG/1
300 TABLET ORAL DAILY
Qty: 90 TABLET | Refills: 1 | Status: SHIPPED | OUTPATIENT
Start: 2024-08-27

## 2024-11-26 DIAGNOSIS — B35.9 TINEA: ICD-10-CM

## 2024-11-27 RX ORDER — KETOCONAZOLE 20 MG/G
CREAM TOPICAL DAILY
Qty: 60 G | Refills: 1 | Status: SHIPPED | OUTPATIENT
Start: 2024-11-27

## 2025-01-13 DIAGNOSIS — M47.812 OSTEOARTHRITIS OF CERVICAL SPINE, UNSPECIFIED SPINAL OSTEOARTHRITIS COMPLICATION STATUS: ICD-10-CM

## 2025-01-14 RX ORDER — CELECOXIB 200 MG/1
200 CAPSULE ORAL 2 TIMES DAILY
Qty: 60 CAPSULE | Refills: 0 | Status: SHIPPED | OUTPATIENT
Start: 2025-01-14 | End: 2025-01-20 | Stop reason: SDUPTHER

## 2025-01-20 ENCOUNTER — OFFICE VISIT (OUTPATIENT)
Dept: FAMILY MEDICINE CLINIC | Facility: CLINIC | Age: 75
End: 2025-01-20
Payer: MEDICARE

## 2025-01-20 VITALS
OXYGEN SATURATION: 97 % | BODY MASS INDEX: 30.66 KG/M2 | SYSTOLIC BLOOD PRESSURE: 140 MMHG | DIASTOLIC BLOOD PRESSURE: 84 MMHG | TEMPERATURE: 98.6 F | HEIGHT: 69 IN | WEIGHT: 207 LBS | HEART RATE: 84 BPM

## 2025-01-20 DIAGNOSIS — Z13.83 SCREENING FOR CARDIOVASCULAR, RESPIRATORY, AND GENITOURINARY DISEASES: ICD-10-CM

## 2025-01-20 DIAGNOSIS — Z13.6 SCREENING FOR CARDIOVASCULAR, RESPIRATORY, AND GENITOURINARY DISEASES: ICD-10-CM

## 2025-01-20 DIAGNOSIS — Z12.5 SCREENING FOR PROSTATE CANCER: ICD-10-CM

## 2025-01-20 DIAGNOSIS — Z13.89 SCREENING FOR HEMATURIA OR PROTEINURIA: ICD-10-CM

## 2025-01-20 DIAGNOSIS — E78.2 MODERATE MIXED HYPERLIPIDEMIA NOT REQUIRING STATIN THERAPY: ICD-10-CM

## 2025-01-20 DIAGNOSIS — R53.82 CHRONIC FATIGUE: Primary | ICD-10-CM

## 2025-01-20 DIAGNOSIS — Z13.89 SCREENING FOR CARDIOVASCULAR, RESPIRATORY, AND GENITOURINARY DISEASES: ICD-10-CM

## 2025-01-20 DIAGNOSIS — Z79.899 ON LONG TERM DRUG THERAPY: ICD-10-CM

## 2025-01-20 DIAGNOSIS — F11.20 CONTINUOUS OPIOID DEPENDENCE (HCC): ICD-10-CM

## 2025-01-20 DIAGNOSIS — E78.2 MIXED HYPERLIPIDEMIA: ICD-10-CM

## 2025-01-20 DIAGNOSIS — F99 INSOMNIA DUE TO OTHER MENTAL DISORDER: ICD-10-CM

## 2025-01-20 DIAGNOSIS — M47.812 OSTEOARTHRITIS OF CERVICAL SPINE, UNSPECIFIED SPINAL OSTEOARTHRITIS COMPLICATION STATUS: ICD-10-CM

## 2025-01-20 DIAGNOSIS — Z23 ENCOUNTER FOR IMMUNIZATION: ICD-10-CM

## 2025-01-20 DIAGNOSIS — F51.05 INSOMNIA DUE TO OTHER MENTAL DISORDER: ICD-10-CM

## 2025-01-20 DIAGNOSIS — E55.9 VITAMIN D DEFICIENCY: ICD-10-CM

## 2025-01-20 PROCEDURE — G2211 COMPLEX E/M VISIT ADD ON: HCPCS | Performed by: FAMILY MEDICINE

## 2025-01-20 PROCEDURE — G0009 ADMIN PNEUMOCOCCAL VACCINE: HCPCS

## 2025-01-20 PROCEDURE — 99214 OFFICE O/P EST MOD 30 MIN: CPT | Performed by: FAMILY MEDICINE

## 2025-01-20 PROCEDURE — 90677 PCV20 VACCINE IM: CPT

## 2025-01-20 PROCEDURE — G0439 PPPS, SUBSEQ VISIT: HCPCS | Performed by: FAMILY MEDICINE

## 2025-01-20 RX ORDER — CEPHALEXIN 500 MG/1
2000 CAPSULE ORAL ONCE AS NEEDED
COMMUNITY

## 2025-01-20 RX ORDER — TEMAZEPAM 15 MG/1
15 CAPSULE ORAL
Qty: 30 CAPSULE | Refills: 0 | Status: SHIPPED | OUTPATIENT
Start: 2025-01-20

## 2025-01-20 RX ORDER — CELECOXIB 200 MG/1
200 CAPSULE ORAL 2 TIMES DAILY
Qty: 180 CAPSULE | Refills: 3 | Status: SHIPPED | OUTPATIENT
Start: 2025-01-20

## 2025-01-20 NOTE — ASSESSMENT & PLAN NOTE
Orders:    celecoxib (CeleBREX) 200 mg capsule; Take 1 capsule (200 mg total) by mouth 2 (two) times a day    Ambulatory referral to Rheumatology; Future     normal...

## 2025-01-20 NOTE — PROGRESS NOTES
Name: Reese Nicole      : 1950      MRN: [id number]  Encounter Provider: JESSICA Cannon DO  Encounter Date: 2025   Encounter department: [facility] PRIMARY CARE [place]    Assessment & Plan  Osteoarthritis of cervical spine, unspecified spinal osteoarthritis complication status    Orders:    celecoxib (CeleBREX) 200 mg capsule; Take 1 capsule (200 mg total) by mouth 2 (two) times a day    Ambulatory referral to Rheumatology; Future    Chronic fatigue    Orders:    CBC; Future    TSH, 3rd generation; Future    Screening for hematuria or proteinuria    Orders:    UA w Reflex to Microscopic w Reflex to Culture    Albumin / creatinine urine ratio    Screening for cardiovascular, respiratory, and genitourinary diseases    Orders:    Comprehensive metabolic panel; Future    Mixed hyperlipidemia    Orders:    Lipid panel; Future    Vitamin D deficiency    Orders:    Vitamin D 25 hydroxy; Future    On long term drug therapy    Orders:    Hemoglobin A1C; Future    Screening for prostate cancer    Orders:    PSA, Total Screen; Future    Moderate mixed hyperlipidemia not requiring statin therapy    Orders:    Lipoprotein A (LPA); Future    Insomnia due to other mental disorder    Orders:    temazepam (RESTORIL) 15 mg capsule; Take 1 capsule (15 mg total) by mouth daily at bedtime as needed for sleep    Encounter for immunization    Orders:    Pneumococcal Conjugate Vaccine 20-valent (Pcv20)      Assessment & Plan  1. Arthritis - Significant improvement in joint pain with Celebrex 200 mg twice daily, particularly in the wrists, shoulders, and neck. Still experiences some stiffness and limited range of motion. History of wrist surgery with ongoing swelling and pain.  - Continue Celebrex with food and monitor for gastrointestinal symptoms  - Referral to rheumatologist, Dr. Lew, for further evaluation and potential alternative treatments  - Blood tests every 6 months to monitor kidney function    2.  Insomnia - Difficulty sleeping despite taking zolpidem.  - Discontinue zolpidem  - Trial of Restoril 15 mg for 2 weeks  - Maintain a regular sleep schedule and engage in physical activity    3. Depression - Feeling down and constantly thinking about his son, affecting his mood. (Overdose, age 49 - misses him greatly)  - Engage in social activities and consider volunteering  - Maintain a regular exercise routine  - Consider alternative antidepressants if symptoms persist    4. Health Maintenance - Will receive the Prevnar 20 vaccine today.  - Get RSV vaccine and Tdap vaccine at the pharmacy if not already received  - Comprehensive blood test, including PSA and A1c, ordered  - Fast for 8 hours before the blood test    Follow-up  - Follow up in 2 to 3 months    PROCEDURE  The patient underwent right wrist surgery 3 years ago, which involved the removal of an arthritic bone and the replacement of a tendon.    Depression Screening and Follow-up Plan: Patient's depression screening was positive with a PHQ-9 score of 7.         History of Present Illness     History of Present Illness  The patient is a 74-year-old male who presents for evaluation of arthritis, insomnia, and depression.    Arthritis  - Managing with Celebrex 200 mg twice daily for the past 5 to 7 years, which he reports as effective  - Previously tried Tylenol and Vioxx, found Vioxx more beneficial than Celebrex  - Primarily affects neck, wrists, and knees  - Underwent CT scans of neck and bilateral knee replacements due to arthritis  - Current concern is worsening arthritis in wrists, particularly the right one, surgically treated 3 years ago  - Surgery involved removal of an arthritic bone and replacement of a tendon  - Reports increasing swelling and development of a bump on wrist  - Experiences severe pain when lifting light objects such as a plastic shopping bag  - Has not yet consulted a rheumatologist  - Reports limited neck mobility due to  arthritis, somewhat improved with Celebrex  - Notes knee stiffness is most pronounced upon waking in the morning  - Unable to secure a part-time job due to arthritis    Insomnia  - Experiencing frequent awakenings at night  - Tried zolpidem, one tablet at bedtime, found it ineffective  - Increasing dose to two tablets also did not improve sleep  - Wakes up every hour to hour and a half during the night  - Typically falls asleep around 10:00 PM while watching TV but wakes up multiple times throughout the night  - History of poor sleep, frequency of awakenings has increased recently    Depression  - Feeling down frequently, with persistent thoughts about his son  - Discontinued bupropion as he did not find it beneficial  - Considered volunteering at the 's office or [employer]    Supplemental information: He has received influenza and shingles vaccines. He is uncertain about his tetanus vaccination status but believes he may have received it 6 to 8 months ago. He has not received the COVID-19 vaccine due to concerns about potential side effects. He has no history of smoking or lung disease. He has gained 8 pounds recently due to poor dietary habits. He has access to a gym in his apartment building but has not been utilizing it. He had a colonoscopy in 2021, during which a small polyp was removed and tested negative. He does not consume alcohol and takes calcium and vitamin D supplements.    SOCIAL HISTORY  He does not smoke. He does not drink alcohol.    MEDICATIONS  Celecoxib, zolpidem, bupropion (discontinued).    IMMUNIZATIONS  He has received the influenza and shingles vaccines. He is unsure about his tetanus vaccination status but believes he may have received it 6 to 8 months ago.     Review of Systems   Constitutional:  Negative for chills and fever.   HENT:  Negative for ear pain and sore throat.    Eyes:  Negative for pain and visual disturbance.   Respiratory:  Negative for cough and shortness of  "breath.    Cardiovascular:  Negative for chest pain and palpitations.   Gastrointestinal:  Negative for abdominal pain and vomiting.   Genitourinary:  Negative for dysuria and hematuria.   Musculoskeletal:  Negative for arthralgias and back pain.   Skin:  Negative for color change and rash.   Neurological:  Negative for seizures and syncope.   All other systems reviewed and are negative.    Objective   /84 (BP Location: Left arm, Patient Position: Sitting, Cuff Size: Standard)   Pulse 84   Temp 98.6 °F (37 °C) (Temporal)   Ht 5' 9.02\" (1.753 m)   Wt 93.9 kg (207 lb)   SpO2 97%   BMI 30.55 kg/m²     Physical Exam    Physical Exam  Vitals and nursing note reviewed.   Constitutional:       General: He is not in acute distress.     Appearance: Normal appearance. He is well-developed. He is not ill-appearing, toxic-appearing or diaphoretic.   HENT:      Head: Normocephalic and atraumatic.      Nose: Nose normal.      Mouth/Throat:      Mouth: Mucous membranes are moist.   Eyes:      Extraocular Movements: Extraocular movements intact.      Conjunctiva/sclera: Conjunctivae normal.      Pupils: Pupils are equal, round, and reactive to light.   Cardiovascular:      Rate and Rhythm: Normal rate and regular rhythm.      Pulses: Normal pulses.      Heart sounds: Normal heart sounds. No murmur heard.     No friction rub.   Pulmonary:      Effort: Pulmonary effort is normal. No respiratory distress.      Breath sounds: Normal breath sounds. No stridor. No wheezing or rhonchi.   Abdominal:      Palpations: Abdomen is soft.      Tenderness: There is no abdominal tenderness.   Musculoskeletal:         General: No swelling.      Cervical back: Neck supple.      Comments: Patient states he cannot function without Celebrex.  Vioxx worked even better 8 years ago but now off the market.  Celebrex 200 mg twice daily enables him to move his neck and much better range of motion although not full range of motion but also helps " "with the pain in both wrists and hands and the stiffness n the knees.  Mostly, the wrist, shoulders, and neck   Skin:     General: Skin is warm and dry.      Coloration: Skin is not jaundiced or pale.      Findings: No erythema or rash.   Neurological:      General: No focal deficit present.      Mental Status: He is alert and oriented to person, place, and time. Mental status is at baseline.   Psychiatric:         Mood and Affect: Mood normal.         Behavior: Behavior normal.         Thought Content: Thought content normal.         Judgment: Judgment normal.       Administrative Statements   I have spent a total time of 30 minutes in caring for this patient on the day of the visit/encounter including Diagnostic results, Prognosis, Risks and benefits of tx options, Instructions for management, Patient and family education, Importance of tx compliance, Risk factor reductions, Impressions, Counseling / Coordination of care, Documenting in the medical record, Reviewing / ordering tests, medicine, procedures  , and Obtaining or reviewing history  .   Answers submitted by the patient for this visit:  Medicare Annual Wellness Visit (Submitted on 1/13/2025)  How would you rate your overall health?: fair  Compared to last year, how is your physical health?: slightly worse  In general, how satisfied are you with your life?: dissatisfied  Compared to last year, how is your eyesight?: same  Compared to last year, how is your hearing?: same  Compared to last year, how is your emotional/mental health?: same  How often is anger a problem for you?: never, rarely  How often do you feel unusually tired/fatigued?: sometimes  In the past 7 days, how much pain have you experienced?: some  If you answered \"some\" or \"a lot\", please rate the severity of your pain on a scale of 1 to 10 (1 being the least severe pain and 10 being the most intense pain).: 7/10  In the past 6 months, have you lost or gained 10 pounds without trying?: " No  One or more falls in the last year: No  Do you have trouble with the stairs inside or outside your home?: No  Does your home have working smoke alarms?: Yes  Does your home have a carbon monoxide monitor?: Yes  Which safety hazards (if any) have you experienced in your home? Please select all that apply.: none  How would you describe your current diet? Please select all that apply.: Regular, Limited junk food  In addition to prescription medications, are you taking any over-the-counter supplements?: Yes  If yes, what supplements are you taking?: vitamin C, vitamin D3, vitamin B complex, Magnesium Glycinate  Can you manage your medications?: Yes  Are you currently taking any opioid medications?: No  Can you walk and transfer into and out of your bed and chair?: Yes  Can you dress and groom yourself?: Yes  Can you bathe or shower yourself?: Yes  Can you feed yourself?: Yes  Can you do your laundry/ housekeeping?: Yes  Can you manage your money, pay your bills, and track your expenses?: Yes  Can you make your own meals?: Yes  Can you do your own shopping?: Yes  Within the last 12 months, have you had any hospitalizations or Emergency Department visits?: No  Do you have a living will?: Yes  Do you have a Durable POA (Power of ) for healthcare decisions?: Yes  Do you have an Advanced Directive for end of life decisions?: Yes  How often have you used an illegal drug (including marijuana) or a prescription medication for non-medical reasons in the past year?: never  What is the typical number of drinks you consume in a day?: 0  What is the typical number of drinks you consume in a week?: 0  How often did you have a drink containing alcohol in the past year?: monthly or less  How many drinks did you have on a typical day  when you were drinking in the past year?: 1 to 2  How often did you have 6 or more drinks on one occasion in the past year?: never

## 2025-01-20 NOTE — ASSESSMENT & PLAN NOTE
Orders:    celecoxib (CeleBREX) 200 mg capsule; Take 1 capsule (200 mg total) by mouth 2 (two) times a day    Ambulatory referral to Rheumatology; Future

## 2025-01-20 NOTE — PROGRESS NOTES
Name: Reese Nicole      : 1950      MRN: [id number]  Encounter Provider: JESSICA Cannon DO  Encounter Date: 2025   Encounter department: [facility]    NOTE:  Pls see the above note which is to compliment this note, so as to avoid duplication and repetition.'  Assessment & Plan  Osteoarthritis of cervical spine, unspecified spinal osteoarthritis complication status    Orders:    celecoxib (CeleBREX) 200 mg capsule; Take 1 capsule (200 mg total) by mouth 2 (two) times a day    Ambulatory referral to Rheumatology; Future    Chronic fatigue    Orders:    CBC; Future    TSH, 3rd generation; Future    Screening for hematuria or proteinuria    Orders:    UA w Reflex to Microscopic w Reflex to Culture    Albumin / creatinine urine ratio    Screening for cardiovascular, respiratory, and genitourinary diseases    Orders:    Comprehensive metabolic panel; Future    Mixed hyperlipidemia    Orders:    Lipid panel; Future    Vitamin D deficiency    Orders:    Vitamin D 25 hydroxy; Future    On long term drug therapy    Orders:    Hemoglobin A1C; Future    Screening for prostate cancer    Orders:    PSA, Total Screen; Future    Moderate mixed hyperlipidemia not requiring statin therapy    Orders:    Lipoprotein A (LPA); Future    Insomnia due to other mental disorder    Orders:    temazepam (RESTORIL) 15 mg capsule; Take 1 capsule (15 mg total) by mouth daily at bedtime as needed for sleep       Preventive health issues were discussed with patient, and age appropriate screening tests were ordered as noted in patient's After Visit Summary. Personalized health advice and appropriate referrals for health education or preventive services given if needed, as noted in patient's After Visit Summary.    History of Present Illness     HPI   Patient Care Team:  JESSICA Cannon DO as PCP - General (Family Medicine)    Review of Systems  Medical History Reviewed by provider this encounter:  Tobacco   Allergies  Meds  Problems  Med Hx  Surg Hx  Fam Hx       Annual Wellness Visit Questionnaire   Reese is here for his Subsequent Wellness visit. Last Medicare Wellness visit information reviewed, patient interviewed and updates made to the record today.      Health Risk Assessment:   Patient rates overall health as fair. Patient feels that their physical health rating is slightly worse. Patient is dissatisfied with their life. Eyesight was rated as same. Hearing was rated as same. Patient feels that their emotional and mental health rating is same. Patients states they are never, rarely angry. Patient states they are sometimes unusually tired/fatigued. Pain experienced in the last 7 days has been some. Patient's pain rating has been 7/10. Patient states that he has experienced no weight loss or gain in last 6 months. Pt has OA pain in R wrist as well as neck.     Depression Screening:   PHQ-9 Score: 7      Fall Risk Screening:   In the past year, patient has experienced: no history of falling in past year      Home Safety:  Patient does not have trouble with stairs inside or outside of their home. Patient has working smoke alarms and has working carbon monoxide detector. Home safety hazards include: none.     Nutrition:   Current diet is Regular, Limited junk food and Other (please comment). Pt has regular intake of chicken/beef/fish -- as well as some fruits and vegetables.     Medications:   Patient is currently taking over-the-counter supplements. OTC medications include: see medication list. Patient is able to manage medications.     Activities of Daily Living (ADLs)/Instrumental Activities of Daily Living (IADLs):   Walk and transfer into and out of bed and chair?: Yes  Dress and groom yourself?: Yes    Bathe or shower yourself?: Yes    Feed yourself? Yes  Do your laundry/housekeeping?: Yes  Manage your money, pay your bills and track your expenses?: Yes  Make your own meals?: Yes    Do your own  shopping?: Yes    Previous Hospitalizations:   Any hospitalizations or ED visits within the last 12 months?: No      Advance Care Planning:   Living will: Yes    Durable POA for healthcare: Yes    Advanced directive: Yes    Advanced directive counseling given: Yes    ACP document given: Yes    Patient declined ACP directive: No    End of Life Decisions reviewed with patient: Yes    Provider agrees with end of life decisions: Yes      Cognitive Screening:   Provider or family/friend/caregiver concerned regarding cognition?: No    PREVENTIVE SCREENINGS      Cardiovascular Screening:    General: Screening Current and Risks and Benefits Discussed      Diabetes Screening:     General: Screening Current and Risks and Benefits Discussed      Colorectal Cancer Screening:     General: Screening Current      Prostate Cancer Screening:    General: Risks and Benefits Discussed      Osteoporosis Screening:    General: Risks and Benefits Discussed      Abdominal Aortic Aneurysm (AAA) Screening:    Risk factors include: age between 65-76 yo        General: Risks and Benefits Discussed      Lung Cancer Screening:     General: Screening Not Indicated and Risks and Benefits Discussed      Hepatitis C Screening:    General: Screening Current and Risks and Benefits Discussed    Hep C Screening Accepted: Yes      Screening, Brief Intervention, and Referral to Treatment (SBIRT)    Screening  Typical number of drinks in a day: 0  Typical number of drinks in a week: 0  Interpretation: Low risk drinking behavior.    AUDIT-C Screenin) How often did you have a drink containing alcohol in the past year? monthly or less  2) How many drinks did you have on a typical day when you were drinking in the past year? 1 to 2  3) How often did you have 6 or more drinks on one occasion in the past year? never    AUDIT-C Score: 1  Interpretation: Score 0-3 (male): Negative screen for alcohol misuse    Single Item Drug Screening:  How often have you  "used an illegal drug (including marijuana) or a prescription medication for non-medical reasons in the past year? never    Single Item Drug Screen Score: 0  Interpretation: Negative screen for possible drug use disorder    Brief Intervention  Alcohol & drug use screenings were reviewed. No concerns regarding substance use disorder identified. Healthy alcohol use/limits discussed.     Other Counseling Topics:   Car/seat belt/driving safety, skin self-exam, sunscreen and calcium and vitamin D intake and regular weightbearing exercise.     Social Drivers of Health     Financial Resource Strain: Low Risk  (11/28/2023)    [facility]    Overall Financial Resource Strain (CARDIA)     Difficulty of Paying Living Expenses: Not very hard   Food Insecurity: No Food Insecurity (1/13/2025)    Hunger Vital Sign     Worried About Running Out of Food in the Last Year: Never true     Ran Out of Food in the Last Year: Never true   Transportation Needs: No Transportation Needs (1/13/2025)    PRAPARE - Transportation     Lack of Transportation (Medical): No     Lack of Transportation (Non-Medical): No   Housing Stability: Low Risk  (1/13/2025)    Housing Stability Vital Sign     Unable to Pay for Housing in the Last Year: No     Number of Times Moved in the Last Year: 0     Homeless in the Last Year: No   Utilities: Not At Risk (1/13/2025)    [facility] Utilities     Threatened with loss of utilities: No     No results found.    Objective   /84 (BP Location: Left arm, Patient Position: Sitting, Cuff Size: Standard)   Pulse 84   Temp 98.6 °F (37 °C) (Temporal)   Ht 5' 9.02\" (1.753 m)   Wt 93.9 kg (207 lb)   SpO2 97%   BMI 30.55 kg/m²     Physical Exam  Administrative Statements   I have spent a total time of 40 minutes in caring for this patient on the day of the visit/encounter including Diagnostic results, Prognosis, Risks and benefits of tx options, Instructions for " management, Patient and family education, Importance of tx compliance, Risk factor reductions, Impressions, Counseling / Coordination of care, Documenting in the medical record, Reviewing / ordering tests, medicine, procedures  , and Obtaining or reviewing history  .

## 2025-01-22 ENCOUNTER — APPOINTMENT (OUTPATIENT)
Dept: LAB | Facility: CLINIC | Age: 75
End: 2025-01-22
Payer: MEDICARE

## 2025-01-22 DIAGNOSIS — Z79.899 ON LONG TERM DRUG THERAPY: ICD-10-CM

## 2025-01-22 DIAGNOSIS — Z13.6 SCREENING FOR CARDIOVASCULAR, RESPIRATORY, AND GENITOURINARY DISEASES: ICD-10-CM

## 2025-01-22 DIAGNOSIS — E78.2 MODERATE MIXED HYPERLIPIDEMIA NOT REQUIRING STATIN THERAPY: ICD-10-CM

## 2025-01-22 DIAGNOSIS — Z13.89 SCREENING FOR CARDIOVASCULAR, RESPIRATORY, AND GENITOURINARY DISEASES: ICD-10-CM

## 2025-01-22 DIAGNOSIS — R53.82 CHRONIC FATIGUE: ICD-10-CM

## 2025-01-22 DIAGNOSIS — Z13.83 SCREENING FOR CARDIOVASCULAR, RESPIRATORY, AND GENITOURINARY DISEASES: ICD-10-CM

## 2025-01-22 DIAGNOSIS — Z12.5 SCREENING FOR PROSTATE CANCER: ICD-10-CM

## 2025-01-22 DIAGNOSIS — E78.2 MIXED HYPERLIPIDEMIA: ICD-10-CM

## 2025-01-22 DIAGNOSIS — E55.9 VITAMIN D DEFICIENCY: ICD-10-CM

## 2025-01-22 LAB
25(OH)D3 SERPL-MCNC: 32.3 NG/ML (ref 30–100)
ALBUMIN SERPL BCG-MCNC: 4.2 G/DL (ref 3.5–5)
ALP SERPL-CCNC: 85 U/L (ref 34–104)
ALT SERPL W P-5'-P-CCNC: 20 U/L (ref 7–52)
ANION GAP SERPL CALCULATED.3IONS-SCNC: 6 MMOL/L (ref 4–13)
AST SERPL W P-5'-P-CCNC: 21 U/L (ref 13–39)
BACTERIA UR QL AUTO: ABNORMAL /HPF
BILIRUB SERPL-MCNC: 0.58 MG/DL (ref 0.2–1)
BILIRUB UR QL STRIP: NEGATIVE
BUN SERPL-MCNC: 24 MG/DL (ref 5–25)
CALCIUM SERPL-MCNC: 9.6 MG/DL (ref 8.4–10.2)
CHLORIDE SERPL-SCNC: 103 MMOL/L (ref 96–108)
CHOLEST SERPL-MCNC: 213 MG/DL (ref ?–200)
CLARITY UR: CLEAR
CO2 SERPL-SCNC: 29 MMOL/L (ref 21–32)
COLOR UR: YELLOW
CREAT SERPL-MCNC: 0.89 MG/DL (ref 0.6–1.3)
CREAT UR-MCNC: 190 MG/DL
ERYTHROCYTE [DISTWIDTH] IN BLOOD BY AUTOMATED COUNT: 15.3 % (ref 11.6–15.1)
EST. AVERAGE GLUCOSE BLD GHB EST-MCNC: 128 MG/DL
GFR SERPL CREATININE-BSD FRML MDRD: 84 ML/MIN/1.73SQ M
GLUCOSE P FAST SERPL-MCNC: 92 MG/DL (ref 65–99)
GLUCOSE UR STRIP-MCNC: NEGATIVE MG/DL
HBA1C MFR BLD: 6.1 %
HCT VFR BLD AUTO: 47.9 % (ref 36.5–49.3)
HDLC SERPL-MCNC: 48 MG/DL
HGB BLD-MCNC: 15.6 G/DL (ref 12–17)
HGB UR QL STRIP.AUTO: NEGATIVE
KETONES UR STRIP-MCNC: NEGATIVE MG/DL
LDLC SERPL CALC-MCNC: 150 MG/DL (ref 0–100)
LEUKOCYTE ESTERASE UR QL STRIP: NEGATIVE
MCH RBC QN AUTO: 27.5 PG (ref 26.8–34.3)
MCHC RBC AUTO-ENTMCNC: 32.6 G/DL (ref 31.4–37.4)
MCV RBC AUTO: 84 FL (ref 82–98)
MICROALBUMIN UR-MCNC: 15.6 MG/L
MICROALBUMIN/CREAT 24H UR: 8 MG/G CREATININE (ref 0–30)
MUCOUS THREADS UR QL AUTO: ABNORMAL
NITRITE UR QL STRIP: NEGATIVE
NON-SQ EPI CELLS URNS QL MICRO: ABNORMAL /HPF
NONHDLC SERPL-MCNC: 165 MG/DL
PH UR STRIP.AUTO: 6.5 [PH]
PLATELET # BLD AUTO: 296 THOUSANDS/UL (ref 149–390)
PMV BLD AUTO: 9.5 FL (ref 8.9–12.7)
POTASSIUM SERPL-SCNC: 4.3 MMOL/L (ref 3.5–5.3)
PROT SERPL-MCNC: 8.1 G/DL (ref 6.4–8.4)
PROT UR STRIP-MCNC: ABNORMAL MG/DL
PSA SERPL-MCNC: 1.5 NG/ML (ref 0–4)
RBC # BLD AUTO: 5.68 MILLION/UL (ref 3.88–5.62)
RBC #/AREA URNS AUTO: ABNORMAL /HPF
SODIUM SERPL-SCNC: 138 MMOL/L (ref 135–147)
SP GR UR STRIP.AUTO: 1.03 (ref 1–1.03)
TRIGL SERPL-MCNC: 76 MG/DL (ref ?–150)
TSH SERPL DL<=0.05 MIU/L-ACNC: 2.31 UIU/ML (ref 0.45–4.5)
UROBILINOGEN UR STRIP-ACNC: 2 MG/DL
WBC # BLD AUTO: 9.2 THOUSAND/UL (ref 4.31–10.16)
WBC #/AREA URNS AUTO: ABNORMAL /HPF

## 2025-01-22 PROCEDURE — 81001 URINALYSIS AUTO W/SCOPE: CPT | Performed by: FAMILY MEDICINE

## 2025-01-22 PROCEDURE — 80061 LIPID PANEL: CPT

## 2025-01-22 PROCEDURE — 82043 UR ALBUMIN QUANTITATIVE: CPT | Performed by: FAMILY MEDICINE

## 2025-01-22 PROCEDURE — 36415 COLL VENOUS BLD VENIPUNCTURE: CPT

## 2025-01-22 PROCEDURE — 82570 ASSAY OF URINE CREATININE: CPT | Performed by: FAMILY MEDICINE

## 2025-01-22 PROCEDURE — G0103 PSA SCREENING: HCPCS

## 2025-01-22 PROCEDURE — 83036 HEMOGLOBIN GLYCOSYLATED A1C: CPT

## 2025-01-22 PROCEDURE — 83695 ASSAY OF LIPOPROTEIN(A): CPT

## 2025-01-22 PROCEDURE — 80053 COMPREHEN METABOLIC PANEL: CPT

## 2025-01-22 PROCEDURE — 85027 COMPLETE CBC AUTOMATED: CPT

## 2025-01-22 PROCEDURE — 84443 ASSAY THYROID STIM HORMONE: CPT

## 2025-01-22 PROCEDURE — 82306 VITAMIN D 25 HYDROXY: CPT

## 2025-01-23 LAB — LPA SERPL-SCNC: 82.2 NMOL/L

## 2025-01-26 ENCOUNTER — RESULTS FOLLOW-UP (OUTPATIENT)
Dept: FAMILY MEDICINE CLINIC | Facility: CLINIC | Age: 75
End: 2025-01-26

## 2025-01-26 DIAGNOSIS — E78.2 MIXED HYPERLIPIDEMIA: Primary | ICD-10-CM

## 2025-01-27 DIAGNOSIS — E78.2 MIXED HYPERLIPIDEMIA: Primary | ICD-10-CM

## 2025-01-27 RX ORDER — ROSUVASTATIN CALCIUM 20 MG/1
20 TABLET, COATED ORAL DAILY
Qty: 30 TABLET | Refills: 5 | Status: SHIPPED | OUTPATIENT
Start: 2025-01-27

## 2025-01-27 NOTE — TELEPHONE ENCOUNTER
Queued Crestor 20 mg OD at this time per PCP note and patient request. Routed to Rx inbox at this time.

## 2025-03-04 ENCOUNTER — HOSPITAL ENCOUNTER (OUTPATIENT)
Dept: RADIOLOGY | Facility: HOSPITAL | Age: 75
Discharge: HOME | End: 2025-03-04
Payer: MEDICARE

## 2025-03-04 ENCOUNTER — APPOINTMENT (OUTPATIENT)
Dept: LAB | Facility: CLINIC | Age: 75
End: 2025-03-04
Payer: MEDICARE

## 2025-03-04 DIAGNOSIS — M06.4 INFLAMMATORY POLYARTHROPATHY (HCC): ICD-10-CM

## 2025-03-04 DIAGNOSIS — M54.2 CERVICALGIA: ICD-10-CM

## 2025-03-04 LAB
CRP SERPL QL: 25 MG/L
ERYTHROCYTE [SEDIMENTATION RATE] IN BLOOD: 34 MM/HOUR (ref 0–19)

## 2025-03-04 PROCEDURE — 87521 HEPATITIS C PROBE&RVRS TRNSC: CPT

## 2025-03-04 PROCEDURE — 86039 ANTINUCLEAR ANTIBODIES (ANA): CPT

## 2025-03-04 PROCEDURE — 73120 X-RAY EXAM OF HAND: CPT

## 2025-03-04 PROCEDURE — 86036 ANCA SCREEN EACH ANTIBODY: CPT

## 2025-03-04 PROCEDURE — 73110 X-RAY EXAM OF WRIST: CPT

## 2025-03-04 PROCEDURE — 85652 RBC SED RATE AUTOMATED: CPT

## 2025-03-04 PROCEDURE — 72052 X-RAY EXAM NECK SPINE 6/>VWS: CPT

## 2025-03-04 PROCEDURE — 36415 COLL VENOUS BLD VENIPUNCTURE: CPT

## 2025-03-04 PROCEDURE — 86038 ANTINUCLEAR ANTIBODIES: CPT

## 2025-03-04 PROCEDURE — 86200 CCP ANTIBODY: CPT

## 2025-03-04 PROCEDURE — 86021 WBC ANTIBODY IDENTIFICATION: CPT

## 2025-03-04 PROCEDURE — 86140 C-REACTIVE PROTEIN: CPT

## 2025-03-04 PROCEDURE — 86430 RHEUMATOID FACTOR TEST QUAL: CPT

## 2025-03-04 PROCEDURE — 86037 ANCA TITER EACH ANTIBODY: CPT

## 2025-03-04 PROCEDURE — 87522 HEPATITIS C REVRS TRNSCRPJ: CPT

## 2025-03-05 LAB
ANA HOMOGEN SER QL IF: NORMAL
ANA HOMOGEN TITR SER: NORMAL {TITER}
ANA SER QL IF: POSITIVE
HCV RNA SERPL NAA+PROBE-ACNC: NOT DETECTED K[IU]/ML
RHEUMATOID FACT SER QL LA: NEGATIVE

## 2025-03-07 LAB — CCP AB SER IA-ACNC: 2.7 (ref ?–10)

## 2025-03-10 LAB — HLA-B27 QL NAA+PROBE: POSITIVE

## 2025-03-11 LAB
ANCA AB SER QL: ABNORMAL
MYELOPEROXIDASE AB SER IA-ACNC: <1 AI
P-ANCA ATYPICAL TITR SER IF: ABNORMAL TITER
PROTEINASE3 AB SER IA-ACNC: <1 AI

## 2025-04-17 ENCOUNTER — APPOINTMENT (OUTPATIENT)
Dept: LAB | Facility: CLINIC | Age: 75
End: 2025-04-17
Attending: INTERNAL MEDICINE
Payer: MEDICARE

## 2025-04-17 DIAGNOSIS — Z79.899 POLYPHARMACY: ICD-10-CM

## 2025-04-17 LAB
ALBUMIN SERPL BCG-MCNC: 3.9 G/DL (ref 3.5–5)
ALP SERPL-CCNC: 58 U/L (ref 34–104)
ALT SERPL W P-5'-P-CCNC: 48 U/L (ref 7–52)
ANION GAP SERPL CALCULATED.3IONS-SCNC: 6 MMOL/L (ref 4–13)
AST SERPL W P-5'-P-CCNC: 26 U/L (ref 13–39)
BASOPHILS # BLD AUTO: 0.07 THOUSANDS/ÂΜL (ref 0–0.1)
BASOPHILS NFR BLD AUTO: 1 % (ref 0–1)
BILIRUB SERPL-MCNC: 0.61 MG/DL (ref 0.2–1)
BUN SERPL-MCNC: 23 MG/DL (ref 5–25)
CALCIUM SERPL-MCNC: 9 MG/DL (ref 8.4–10.2)
CHLORIDE SERPL-SCNC: 104 MMOL/L (ref 96–108)
CO2 SERPL-SCNC: 28 MMOL/L (ref 21–32)
CREAT SERPL-MCNC: 0.87 MG/DL (ref 0.6–1.3)
CRP SERPL QL: 6.5 MG/L
EOSINOPHIL # BLD AUTO: 0.27 THOUSAND/ÂΜL (ref 0–0.61)
EOSINOPHIL NFR BLD AUTO: 3 % (ref 0–6)
ERYTHROCYTE [DISTWIDTH] IN BLOOD BY AUTOMATED COUNT: 17.5 % (ref 11.6–15.1)
ERYTHROCYTE [SEDIMENTATION RATE] IN BLOOD: 25 MM/HOUR (ref 0–19)
GFR SERPL CREATININE-BSD FRML MDRD: 85 ML/MIN/1.73SQ M
GLUCOSE P FAST SERPL-MCNC: 88 MG/DL (ref 65–99)
HCT VFR BLD AUTO: 47.4 % (ref 36.5–49.3)
HGB BLD-MCNC: 15.1 G/DL (ref 12–17)
IMM GRANULOCYTES # BLD AUTO: 0.02 THOUSAND/UL (ref 0–0.2)
IMM GRANULOCYTES NFR BLD AUTO: 0 % (ref 0–2)
LYMPHOCYTES # BLD AUTO: 2.08 THOUSANDS/ÂΜL (ref 0.6–4.47)
LYMPHOCYTES NFR BLD AUTO: 21 % (ref 14–44)
MCH RBC QN AUTO: 27.6 PG (ref 26.8–34.3)
MCHC RBC AUTO-ENTMCNC: 31.9 G/DL (ref 31.4–37.4)
MCV RBC AUTO: 87 FL (ref 82–98)
MONOCYTES # BLD AUTO: 0.8 THOUSAND/ÂΜL (ref 0.17–1.22)
MONOCYTES NFR BLD AUTO: 8 % (ref 4–12)
NEUTROPHILS # BLD AUTO: 6.82 THOUSANDS/ÂΜL (ref 1.85–7.62)
NEUTS SEG NFR BLD AUTO: 67 % (ref 43–75)
NRBC BLD AUTO-RTO: 0 /100 WBCS
PLATELET # BLD AUTO: 275 THOUSANDS/UL (ref 149–390)
PMV BLD AUTO: 9.5 FL (ref 8.9–12.7)
POTASSIUM SERPL-SCNC: 3.7 MMOL/L (ref 3.5–5.3)
PROT SERPL-MCNC: 7.4 G/DL (ref 6.4–8.4)
RBC # BLD AUTO: 5.47 MILLION/UL (ref 3.88–5.62)
SODIUM SERPL-SCNC: 138 MMOL/L (ref 135–147)
WBC # BLD AUTO: 10.06 THOUSAND/UL (ref 4.31–10.16)

## 2025-04-17 PROCEDURE — 85652 RBC SED RATE AUTOMATED: CPT

## 2025-04-17 PROCEDURE — 86140 C-REACTIVE PROTEIN: CPT

## 2025-04-17 PROCEDURE — 36415 COLL VENOUS BLD VENIPUNCTURE: CPT

## 2025-04-17 PROCEDURE — 80053 COMPREHEN METABOLIC PANEL: CPT

## 2025-04-17 PROCEDURE — 85025 COMPLETE CBC W/AUTO DIFF WBC: CPT

## 2025-05-30 ENCOUNTER — APPOINTMENT (OUTPATIENT)
Dept: LAB | Facility: CLINIC | Age: 75
End: 2025-05-30
Attending: INTERNAL MEDICINE
Payer: MEDICARE

## 2025-05-30 DIAGNOSIS — Z79.899 POLYPHARMACY: ICD-10-CM

## 2025-05-30 DIAGNOSIS — L40.59 POLYARTICULAR PSORIATIC ARTHRITIS (HCC): ICD-10-CM

## 2025-05-30 LAB
ALBUMIN SERPL BCG-MCNC: 4.3 G/DL (ref 3.5–5)
ALP SERPL-CCNC: 54 U/L (ref 34–104)
ALT SERPL W P-5'-P-CCNC: 41 U/L (ref 7–52)
ANION GAP SERPL CALCULATED.3IONS-SCNC: 7 MMOL/L (ref 4–13)
AST SERPL W P-5'-P-CCNC: 26 U/L (ref 13–39)
BASOPHILS # BLD AUTO: 0.08 THOUSANDS/ÂΜL (ref 0–0.1)
BASOPHILS NFR BLD AUTO: 1 % (ref 0–1)
BILIRUB SERPL-MCNC: 0.57 MG/DL (ref 0.2–1)
BUN SERPL-MCNC: 20 MG/DL (ref 5–25)
CALCIUM SERPL-MCNC: 9.3 MG/DL (ref 8.4–10.2)
CHLORIDE SERPL-SCNC: 104 MMOL/L (ref 96–108)
CO2 SERPL-SCNC: 28 MMOL/L (ref 21–32)
CREAT SERPL-MCNC: 1.05 MG/DL (ref 0.6–1.3)
CRP SERPL QL: 3.6 MG/L
EOSINOPHIL # BLD AUTO: 0.19 THOUSAND/ÂΜL (ref 0–0.61)
EOSINOPHIL NFR BLD AUTO: 2 % (ref 0–6)
ERYTHROCYTE [DISTWIDTH] IN BLOOD BY AUTOMATED COUNT: 18.1 % (ref 11.6–15.1)
ERYTHROCYTE [SEDIMENTATION RATE] IN BLOOD: 18 MM/HOUR (ref 0–19)
GFR SERPL CREATININE-BSD FRML MDRD: 69 ML/MIN/1.73SQ M
GLUCOSE P FAST SERPL-MCNC: 91 MG/DL (ref 65–99)
HCT VFR BLD AUTO: 47.4 % (ref 36.5–49.3)
HGB BLD-MCNC: 15.7 G/DL (ref 12–17)
IMM GRANULOCYTES # BLD AUTO: 0.03 THOUSAND/UL (ref 0–0.2)
IMM GRANULOCYTES NFR BLD AUTO: 0 % (ref 0–2)
LYMPHOCYTES # BLD AUTO: 1.61 THOUSANDS/ÂΜL (ref 0.6–4.47)
LYMPHOCYTES NFR BLD AUTO: 16 % (ref 14–44)
MCH RBC QN AUTO: 28.8 PG (ref 26.8–34.3)
MCHC RBC AUTO-ENTMCNC: 33.1 G/DL (ref 31.4–37.4)
MCV RBC AUTO: 87 FL (ref 82–98)
MONOCYTES # BLD AUTO: 0.97 THOUSAND/ÂΜL (ref 0.17–1.22)
MONOCYTES NFR BLD AUTO: 10 % (ref 4–12)
NEUTROPHILS # BLD AUTO: 7.06 THOUSANDS/ÂΜL (ref 1.85–7.62)
NEUTS SEG NFR BLD AUTO: 71 % (ref 43–75)
NRBC BLD AUTO-RTO: 0 /100 WBCS
PLATELET # BLD AUTO: 265 THOUSANDS/UL (ref 149–390)
PMV BLD AUTO: 9.2 FL (ref 8.9–12.7)
POTASSIUM SERPL-SCNC: 3.8 MMOL/L (ref 3.5–5.3)
PROT SERPL-MCNC: 7.4 G/DL (ref 6.4–8.4)
RBC # BLD AUTO: 5.45 MILLION/UL (ref 3.88–5.62)
SODIUM SERPL-SCNC: 139 MMOL/L (ref 135–147)
WBC # BLD AUTO: 9.94 THOUSAND/UL (ref 4.31–10.16)

## 2025-05-30 PROCEDURE — 36415 COLL VENOUS BLD VENIPUNCTURE: CPT

## 2025-05-30 PROCEDURE — 85652 RBC SED RATE AUTOMATED: CPT

## 2025-05-30 PROCEDURE — 86140 C-REACTIVE PROTEIN: CPT

## 2025-05-30 PROCEDURE — 80053 COMPREHEN METABOLIC PANEL: CPT

## 2025-05-30 PROCEDURE — 85025 COMPLETE CBC W/AUTO DIFF WBC: CPT

## 2025-07-08 ENCOUNTER — OFFICE VISIT (OUTPATIENT)
Dept: FAMILY MEDICINE CLINIC | Facility: CLINIC | Age: 75
End: 2025-07-08
Payer: MEDICARE

## 2025-07-08 VITALS
RESPIRATION RATE: 20 BRPM | WEIGHT: 209 LBS | HEART RATE: 95 BPM | DIASTOLIC BLOOD PRESSURE: 96 MMHG | BODY MASS INDEX: 30.85 KG/M2 | OXYGEN SATURATION: 97 % | SYSTOLIC BLOOD PRESSURE: 142 MMHG | TEMPERATURE: 97.8 F

## 2025-07-08 DIAGNOSIS — J35.8 APHTHOUS ULCER OF TONSIL: ICD-10-CM

## 2025-07-08 DIAGNOSIS — J02.9 SORE THROAT: Primary | ICD-10-CM

## 2025-07-08 PROCEDURE — G2211 COMPLEX E/M VISIT ADD ON: HCPCS | Performed by: FAMILY MEDICINE

## 2025-07-08 PROCEDURE — 99213 OFFICE O/P EST LOW 20 MIN: CPT | Performed by: FAMILY MEDICINE

## 2025-07-08 RX ORDER — FLUOCINONIDE 0.5 MG/G
CREAM TOPICAL
COMMUNITY

## 2025-07-08 RX ORDER — FOLIC ACID 1 MG/1
TABLET ORAL
COMMUNITY
Start: 2025-07-08

## 2025-07-08 NOTE — PROGRESS NOTES
Name: Reese Nicole      : 1950      MRN: [id number]  Encounter Provider: JESSICA Cannon DO  Encounter Date: 2025   Encounter department: [facility]  :  Assessment & Plan  Sore throat         Aphthous ulcer of tonsil           Assessment & Plan          Depression Screening and Follow-up Plan: Patient's depression screening was positive with a PHQ-9 score of 5.   Patient with underlying depression and was advised to continue current medications as prescribed. Patient advised to follow-up with PCP for further management.       History of Present Illness   74-year-old Topher developed a very painful aphthous ulcer on the right posterior tonsillar pillar of the throat about 5-6 days ago.  It is quite erythematous around this ulcer base.  All the appearance is of an aphthous ulcer.    Plan--I took a silver nitrate tip and cauterized the base of this ulcer with the idea of killing all the virus and the ulcer and allowing healing tissue to propagate from the base.  He will gargle with lukewarm salt water every few hours    Sore Throat   This is a new problem. The current episode started in the past 7 days. The problem has been unchanged. The pain is worse on the right side. There has been no fever. The pain is at a severity of 8/10. The pain is moderate. Associated symptoms include congestion and coughing. Pertinent negatives include no abdominal pain, diarrhea, drooling, ear discharge, ear pain, headaches, hoarse voice, plugged ear sensation, neck pain, shortness of breath, stridor, swollen glands, trouble swallowing or vomiting. He has tried nothing for the symptoms. The treatment provided no relief.     Review of Systems   HENT:  Positive for congestion and sore throat. Negative for drooling, ear discharge, ear pain, hoarse voice and trouble swallowing.    Respiratory:  Positive for cough. Negative for shortness of breath and stridor.    Gastrointestinal:  Negative for abdominal  pain, diarrhea and vomiting.   Musculoskeletal:  Negative for neck pain.   Neurological:  Negative for headaches.       Objective   /96 (BP Location: Left arm, Patient Position: Sitting, Cuff Size: Large)   Pulse 95   Temp 97.8 °F (36.6 °C)   Resp 20   Wt 94.8 kg (209 lb)   SpO2 97%   BMI 30.85 kg/m²      Physical Exam     no

## 2025-07-11 ENCOUNTER — APPOINTMENT (OUTPATIENT)
Dept: LAB | Facility: CLINIC | Age: 75
End: 2025-07-11
Attending: INTERNAL MEDICINE
Payer: MEDICARE

## 2025-07-11 DIAGNOSIS — Z79.899 POLYPHARMACY: ICD-10-CM

## 2025-07-11 LAB
ALBUMIN SERPL BCG-MCNC: 4.3 G/DL (ref 3.5–5)
ALP SERPL-CCNC: 66 U/L (ref 34–104)
ALT SERPL W P-5'-P-CCNC: 33 U/L (ref 7–52)
ANION GAP SERPL CALCULATED.3IONS-SCNC: 4 MMOL/L (ref 4–13)
AST SERPL W P-5'-P-CCNC: 25 U/L (ref 13–39)
BASOPHILS # BLD AUTO: 0.07 THOUSANDS/ÂΜL (ref 0–0.1)
BASOPHILS NFR BLD AUTO: 1 % (ref 0–1)
BILIRUB SERPL-MCNC: 0.77 MG/DL (ref 0.2–1)
BUN SERPL-MCNC: 22 MG/DL (ref 5–25)
CALCIUM SERPL-MCNC: 9.4 MG/DL (ref 8.4–10.2)
CHLORIDE SERPL-SCNC: 105 MMOL/L (ref 96–108)
CO2 SERPL-SCNC: 29 MMOL/L (ref 21–32)
CREAT SERPL-MCNC: 0.92 MG/DL (ref 0.6–1.3)
CRP SERPL QL: 6.8 MG/L
EOSINOPHIL # BLD AUTO: 0.31 THOUSAND/ÂΜL (ref 0–0.61)
EOSINOPHIL NFR BLD AUTO: 4 % (ref 0–6)
ERYTHROCYTE [DISTWIDTH] IN BLOOD BY AUTOMATED COUNT: 15.7 % (ref 11.6–15.1)
ERYTHROCYTE [SEDIMENTATION RATE] IN BLOOD: 14 MM/HOUR (ref 0–19)
GFR SERPL CREATININE-BSD FRML MDRD: 81 ML/MIN/1.73SQ M
GLUCOSE P FAST SERPL-MCNC: 95 MG/DL (ref 65–99)
HCT VFR BLD AUTO: 47.1 % (ref 36.5–49.3)
HGB BLD-MCNC: 15.9 G/DL (ref 12–17)
IMM GRANULOCYTES # BLD AUTO: 0.03 THOUSAND/UL (ref 0–0.2)
IMM GRANULOCYTES NFR BLD AUTO: 0 % (ref 0–2)
LYMPHOCYTES # BLD AUTO: 1.35 THOUSANDS/ÂΜL (ref 0.6–4.47)
LYMPHOCYTES NFR BLD AUTO: 17 % (ref 14–44)
MCH RBC QN AUTO: 29.6 PG (ref 26.8–34.3)
MCHC RBC AUTO-ENTMCNC: 33.8 G/DL (ref 31.4–37.4)
MCV RBC AUTO: 88 FL (ref 82–98)
MONOCYTES # BLD AUTO: 0.87 THOUSAND/ÂΜL (ref 0.17–1.22)
MONOCYTES NFR BLD AUTO: 11 % (ref 4–12)
NEUTROPHILS # BLD AUTO: 5.26 THOUSANDS/ÂΜL (ref 1.85–7.62)
NEUTS SEG NFR BLD AUTO: 67 % (ref 43–75)
NRBC BLD AUTO-RTO: 0 /100 WBCS
PLATELET # BLD AUTO: 245 THOUSANDS/UL (ref 149–390)
PMV BLD AUTO: 9.7 FL (ref 8.9–12.7)
POTASSIUM SERPL-SCNC: 4 MMOL/L (ref 3.5–5.3)
PROT SERPL-MCNC: 7.5 G/DL (ref 6.4–8.4)
RBC # BLD AUTO: 5.38 MILLION/UL (ref 3.88–5.62)
SODIUM SERPL-SCNC: 138 MMOL/L (ref 135–147)
WBC # BLD AUTO: 7.89 THOUSAND/UL (ref 4.31–10.16)

## 2025-07-11 PROCEDURE — 36415 COLL VENOUS BLD VENIPUNCTURE: CPT

## 2025-07-11 PROCEDURE — 85025 COMPLETE CBC W/AUTO DIFF WBC: CPT

## 2025-07-11 PROCEDURE — 86140 C-REACTIVE PROTEIN: CPT

## 2025-07-11 PROCEDURE — 80053 COMPREHEN METABOLIC PANEL: CPT

## 2025-07-11 PROCEDURE — 85652 RBC SED RATE AUTOMATED: CPT

## 2025-07-18 DIAGNOSIS — F32.9 REACTIVE DEPRESSION: ICD-10-CM

## 2025-07-21 RX ORDER — ALPRAZOLAM 0.5 MG
0.5 TABLET ORAL 2 TIMES DAILY
Qty: 60 TABLET | Refills: 1 | Status: SHIPPED | OUTPATIENT
Start: 2025-07-21

## 2025-07-23 DIAGNOSIS — E78.2 MIXED HYPERLIPIDEMIA: ICD-10-CM

## 2025-07-25 RX ORDER — ROSUVASTATIN CALCIUM 20 MG/1
20 TABLET, COATED ORAL DAILY
Qty: 30 TABLET | Refills: 5 | Status: SHIPPED | OUTPATIENT
Start: 2025-07-25

## (undated) DEVICE — BETHLEHEM UNIVERSAL MINOR GEN: Brand: CARDINAL HEALTH

## (undated) DEVICE — GLOVE INDICATOR PI UNDERGLOVE SZ 7.5 BLUE

## (undated) DEVICE — TUBING SUCTION 5MM X 12 FT

## (undated) DEVICE — TIBURON TRANSVERSE LAPAROTOMY SHEET: Brand: CONVERTORS

## (undated) DEVICE — SUT VICRYL 3-0 SH 27 IN J416H

## (undated) DEVICE — GLOVE PI ULTRA TOUCH SZ.6.5

## (undated) DEVICE — NEPTUNE E-SEP SMOKE EVACUATION PENCIL, COATED, 70MM BLADE, PUSH BUTTON SWITCH: Brand: NEPTUNE E-SEP

## (undated) DEVICE — PADS GROUND

## (undated) DEVICE — SUT VICRYL 2-0 18 IN J911T

## (undated) DEVICE — SUT PROLENE 2-0 CT-2 30 IN 8411H

## (undated) DEVICE — CHLORAPREP HI-LITE 26ML ORANGE

## (undated) DEVICE — SUT VICRYL 2-0 SH 27 IN UNDYED J417H

## (undated) DEVICE — LIGHT HANDLE COVER SLEEVE DISP BLUE STELLAR

## (undated) DEVICE — ADHESIVE SKIN HIGH VISCOSITY EXOFIN 1ML

## (undated) DEVICE — NEEDLE 25G X 1 1/2

## (undated) DEVICE — POOLE SUCTION HANDLE: Brand: CARDINAL HEALTH

## (undated) DEVICE — GLOVE INDICATOR PI UNDERGLOVE SZ 6.5 BLUE

## (undated) DEVICE — GLOVE PI ULTRA TOUCH SZ.7.5

## (undated) DEVICE — STRL PENROSE DRAIN 18" X 1/4": Brand: CARDINAL HEALTH

## (undated) DEVICE — SUT MONOCRYL 4-0 PS-2 18 IN Y496G

## (undated) DEVICE — SYRINGE 10ML LL